# Patient Record
Sex: FEMALE | Race: WHITE | NOT HISPANIC OR LATINO | URBAN - METROPOLITAN AREA
[De-identification: names, ages, dates, MRNs, and addresses within clinical notes are randomized per-mention and may not be internally consistent; named-entity substitution may affect disease eponyms.]

---

## 2023-10-03 ENCOUNTER — INPATIENT (INPATIENT)
Facility: HOSPITAL | Age: 58
LOS: 6 days | Discharge: HOME | End: 2023-10-10
Attending: HOSPITALIST | Admitting: HOSPITALIST
Payer: MEDICARE

## 2023-10-03 VITALS
DIASTOLIC BLOOD PRESSURE: 83 MMHG | TEMPERATURE: 98 F | OXYGEN SATURATION: 99 % | HEART RATE: 90 BPM | SYSTOLIC BLOOD PRESSURE: 121 MMHG | RESPIRATION RATE: 16 BRPM

## 2023-10-03 DIAGNOSIS — R42 DIZZINESS AND GIDDINESS: ICD-10-CM

## 2023-10-03 LAB
ALBUMIN SERPL ELPH-MCNC: 4 G/DL — SIGNIFICANT CHANGE UP (ref 3.3–5)
ALP SERPL-CCNC: 116 U/L — SIGNIFICANT CHANGE UP (ref 40–120)
ALT FLD-CCNC: 51 U/L — HIGH (ref 4–33)
ANION GAP SERPL CALC-SCNC: 13 MMOL/L — SIGNIFICANT CHANGE UP (ref 7–14)
APTT BLD: 35.8 SEC — HIGH (ref 24.5–35.6)
AST SERPL-CCNC: 36 U/L — HIGH (ref 4–32)
BASOPHILS # BLD AUTO: 0.03 K/UL — SIGNIFICANT CHANGE UP (ref 0–0.2)
BASOPHILS NFR BLD AUTO: 0.4 % — SIGNIFICANT CHANGE UP (ref 0–2)
BILIRUB SERPL-MCNC: 0.4 MG/DL — SIGNIFICANT CHANGE UP (ref 0.2–1.2)
BUN SERPL-MCNC: 12 MG/DL — SIGNIFICANT CHANGE UP (ref 7–23)
CALCIUM SERPL-MCNC: 9.3 MG/DL — SIGNIFICANT CHANGE UP (ref 8.4–10.5)
CHLORIDE SERPL-SCNC: 97 MMOL/L — LOW (ref 98–107)
CO2 SERPL-SCNC: 26 MMOL/L — SIGNIFICANT CHANGE UP (ref 22–31)
CREAT SERPL-MCNC: 0.87 MG/DL — SIGNIFICANT CHANGE UP (ref 0.5–1.3)
EGFR: 77 ML/MIN/1.73M2 — SIGNIFICANT CHANGE UP
EOSINOPHIL # BLD AUTO: 0.16 K/UL — SIGNIFICANT CHANGE UP (ref 0–0.5)
EOSINOPHIL NFR BLD AUTO: 1.9 % — SIGNIFICANT CHANGE UP (ref 0–6)
GLUCOSE SERPL-MCNC: 140 MG/DL — HIGH (ref 70–99)
HCT VFR BLD CALC: 38.3 % — SIGNIFICANT CHANGE UP (ref 34.5–45)
HGB BLD-MCNC: 12.3 G/DL — SIGNIFICANT CHANGE UP (ref 11.5–15.5)
IANC: 4.84 K/UL — SIGNIFICANT CHANGE UP (ref 1.8–7.4)
IMM GRANULOCYTES NFR BLD AUTO: 0.6 % — SIGNIFICANT CHANGE UP (ref 0–0.9)
INR BLD: <0.9 RATIO — SIGNIFICANT CHANGE UP (ref 0.85–1.18)
LYMPHOCYTES # BLD AUTO: 2.85 K/UL — SIGNIFICANT CHANGE UP (ref 1–3.3)
LYMPHOCYTES # BLD AUTO: 33.5 % — SIGNIFICANT CHANGE UP (ref 13–44)
MCHC RBC-ENTMCNC: 27 PG — SIGNIFICANT CHANGE UP (ref 27–34)
MCHC RBC-ENTMCNC: 32.1 GM/DL — SIGNIFICANT CHANGE UP (ref 32–36)
MCV RBC AUTO: 84.2 FL — SIGNIFICANT CHANGE UP (ref 80–100)
MONOCYTES # BLD AUTO: 0.57 K/UL — SIGNIFICANT CHANGE UP (ref 0–0.9)
MONOCYTES NFR BLD AUTO: 6.7 % — SIGNIFICANT CHANGE UP (ref 2–14)
NEUTROPHILS # BLD AUTO: 4.84 K/UL — SIGNIFICANT CHANGE UP (ref 1.8–7.4)
NEUTROPHILS NFR BLD AUTO: 56.9 % — SIGNIFICANT CHANGE UP (ref 43–77)
NRBC # BLD: 0 /100 WBCS — SIGNIFICANT CHANGE UP (ref 0–0)
NRBC # FLD: 0 K/UL — SIGNIFICANT CHANGE UP (ref 0–0)
PLATELET # BLD AUTO: 310 K/UL — SIGNIFICANT CHANGE UP (ref 150–400)
POTASSIUM SERPL-MCNC: 4 MMOL/L — SIGNIFICANT CHANGE UP (ref 3.5–5.3)
POTASSIUM SERPL-SCNC: 4 MMOL/L — SIGNIFICANT CHANGE UP (ref 3.5–5.3)
PROT SERPL-MCNC: 6.7 G/DL — SIGNIFICANT CHANGE UP (ref 6–8.3)
PROTHROM AB SERPL-ACNC: 10.1 SEC — SIGNIFICANT CHANGE UP (ref 9.5–13)
RBC # BLD: 4.55 M/UL — SIGNIFICANT CHANGE UP (ref 3.8–5.2)
RBC # FLD: 17.4 % — HIGH (ref 10.3–14.5)
SODIUM SERPL-SCNC: 136 MMOL/L — SIGNIFICANT CHANGE UP (ref 135–145)
TROPONIN T, HIGH SENSITIVITY RESULT: 10 NG/L — SIGNIFICANT CHANGE UP
WBC # BLD: 8.5 K/UL — SIGNIFICANT CHANGE UP (ref 3.8–10.5)
WBC # FLD AUTO: 8.5 K/UL — SIGNIFICANT CHANGE UP (ref 3.8–10.5)

## 2023-10-03 PROCEDURE — 70498 CT ANGIOGRAPHY NECK: CPT | Mod: 26,MA

## 2023-10-03 PROCEDURE — 70450 CT HEAD/BRAIN W/O DYE: CPT | Mod: 26,MA,XU

## 2023-10-03 PROCEDURE — 99291 CRITICAL CARE FIRST HOUR: CPT | Mod: GC

## 2023-10-03 PROCEDURE — 70496 CT ANGIOGRAPHY HEAD: CPT | Mod: 26,MA

## 2023-10-03 PROCEDURE — 99285 EMERGENCY DEPT VISIT HI MDM: CPT

## 2023-10-03 PROCEDURE — 0042T: CPT | Mod: MA

## 2023-10-03 RX ORDER — IPRATROPIUM/ALBUTEROL SULFATE 18-103MCG
3 AEROSOL WITH ADAPTER (GRAM) INHALATION EVERY 6 HOURS
Refills: 0 | Status: ACTIVE | OUTPATIENT
Start: 2023-10-03 | End: 2024-08-31

## 2023-10-03 RX ORDER — ACETAMINOPHEN 500 MG
1000 TABLET ORAL ONCE
Refills: 0 | Status: COMPLETED | OUTPATIENT
Start: 2023-10-03 | End: 2023-10-03

## 2023-10-03 RX ORDER — LAMOTRIGINE 25 MG/1
150 TABLET, ORALLY DISINTEGRATING ORAL DAILY
Refills: 0 | Status: ACTIVE | OUTPATIENT
Start: 2023-10-03 | End: 2024-08-31

## 2023-10-03 RX ORDER — HYDROMORPHONE HYDROCHLORIDE 2 MG/ML
0.5 INJECTION INTRAMUSCULAR; INTRAVENOUS; SUBCUTANEOUS ONCE
Refills: 0 | Status: DISCONTINUED | OUTPATIENT
Start: 2023-10-03 | End: 2023-10-03

## 2023-10-03 RX ORDER — ONDANSETRON 8 MG/1
4 TABLET, FILM COATED ORAL ONCE
Refills: 0 | Status: COMPLETED | OUTPATIENT
Start: 2023-10-03 | End: 2023-10-03

## 2023-10-03 RX ORDER — CHOLECALCIFEROL (VITAMIN D3) 125 MCG
1000 CAPSULE ORAL DAILY
Refills: 0 | Status: ACTIVE | OUTPATIENT
Start: 2023-10-03 | End: 2024-08-31

## 2023-10-03 RX ORDER — BUDESONIDE AND FORMOTEROL FUMARATE DIHYDRATE 160; 4.5 UG/1; UG/1
2 AEROSOL RESPIRATORY (INHALATION)
Refills: 0 | Status: ACTIVE | OUTPATIENT
Start: 2023-10-03 | End: 2024-08-31

## 2023-10-03 RX ORDER — PREGABALIN 225 MG/1
1000 CAPSULE ORAL DAILY
Refills: 0 | Status: ACTIVE | OUTPATIENT
Start: 2023-10-03 | End: 2024-08-31

## 2023-10-03 RX ORDER — AMITRIPTYLINE HCL 25 MG
25 TABLET ORAL DAILY
Refills: 0 | Status: ACTIVE | OUTPATIENT
Start: 2023-10-03 | End: 2024-08-31

## 2023-10-03 RX ORDER — TRIAMTERENE/HYDROCHLOROTHIAZID 75 MG-50MG
1 TABLET ORAL DAILY
Refills: 0 | Status: ACTIVE | OUTPATIENT
Start: 2023-10-03 | End: 2024-08-31

## 2023-10-03 RX ORDER — FLUOXETINE HCL 10 MG
10 CAPSULE ORAL DAILY
Refills: 0 | Status: ACTIVE | OUTPATIENT
Start: 2023-10-03 | End: 2024-08-31

## 2023-10-03 RX ORDER — SODIUM CHLORIDE 9 MG/ML
10 INJECTION INTRAMUSCULAR; INTRAVENOUS; SUBCUTANEOUS ONCE
Refills: 0 | Status: COMPLETED | OUTPATIENT
Start: 2023-10-03 | End: 2023-10-03

## 2023-10-03 RX ORDER — LEVOTHYROXINE SODIUM 125 MCG
25 TABLET ORAL DAILY
Refills: 0 | Status: ACTIVE | OUTPATIENT
Start: 2023-10-03 | End: 2024-08-31

## 2023-10-03 RX ORDER — FUROSEMIDE 40 MG
20 TABLET ORAL DAILY
Refills: 0 | Status: ACTIVE | OUTPATIENT
Start: 2023-10-03 | End: 2024-08-31

## 2023-10-03 RX ORDER — PANTOPRAZOLE SODIUM 20 MG/1
40 TABLET, DELAYED RELEASE ORAL
Refills: 0 | Status: ACTIVE | OUTPATIENT
Start: 2023-10-03 | End: 2024-08-31

## 2023-10-03 RX ORDER — TENECTEPLASE 50 MG
24 KIT INTRAVENOUS ONCE
Refills: 0 | Status: COMPLETED | OUTPATIENT
Start: 2023-10-03 | End: 2023-10-03

## 2023-10-03 RX ORDER — METHYLPHENIDATE HCL 5 MG
20 TABLET ORAL DAILY
Refills: 0 | Status: DISCONTINUED | OUTPATIENT
Start: 2023-10-03 | End: 2023-10-10

## 2023-10-03 RX ORDER — ATORVASTATIN CALCIUM 80 MG/1
40 TABLET, FILM COATED ORAL AT BEDTIME
Refills: 0 | Status: ACTIVE | OUTPATIENT
Start: 2023-10-03 | End: 2024-08-31

## 2023-10-03 RX ORDER — ARIPIPRAZOLE 15 MG/1
20 TABLET ORAL DAILY
Refills: 0 | Status: ACTIVE | OUTPATIENT
Start: 2023-10-03 | End: 2024-08-31

## 2023-10-03 RX ADMIN — ONDANSETRON 4 MILLIGRAM(S): 8 TABLET, FILM COATED ORAL at 23:17

## 2023-10-03 RX ADMIN — Medication 400 MILLIGRAM(S): at 22:06

## 2023-10-03 RX ADMIN — HYDROMORPHONE HYDROCHLORIDE 0.5 MILLIGRAM(S): 2 INJECTION INTRAMUSCULAR; INTRAVENOUS; SUBCUTANEOUS at 23:35

## 2023-10-03 RX ADMIN — SODIUM CHLORIDE 10 MILLILITER(S): 9 INJECTION INTRAMUSCULAR; INTRAVENOUS; SUBCUTANEOUS at 21:44

## 2023-10-03 RX ADMIN — TENECTEPLASE 3456 MILLIGRAM(S): KIT at 21:44

## 2023-10-03 RX ADMIN — HYDROMORPHONE HYDROCHLORIDE 0.5 MILLIGRAM(S): 2 INJECTION INTRAMUSCULAR; INTRAVENOUS; SUBCUTANEOUS at 23:50

## 2023-10-03 NOTE — ED PROVIDER NOTE - PHYSICAL EXAMINATION
Gen: NAD, AOx3, able to make needs known, non-toxic  Head: NCAT  HEENT: EOMI, normal conjunctiva, nystagmus with right sided gaze   Lung: CTAB, no respiratory distress, no wheezes/rhonchi/rales B/L, speaking in full sentences  CV: RRR, no M/R/G, pulses bilaterally   Abd: soft, NTND, no guarding  MSK: no visible bony deformities  Neuro: No focal sensory or motor deficits, 5/5 strength in BUE and BLE, CN 3-12 intact, finger to nose intact bilaterally, no pronator shift, no slurred speech   Skin: Warm, well perfused, no rash  Psych: normal affect Gen: NAD, AOx3, able to make needs known, non-toxic  Head: NCAT  HEENT: EOMI, normal conjunctiva, nystagmus with right sided gaze   Lung: CTAB, no respiratory distress, no wheezes/rhonchi/rales B/L, speaking in full sentences  CV: RRR, no M/R/G, pulses bilaterally   Abd: soft, NTND, no guarding  MSK: no visible bony deformities  Neuro: RUE numbness, 5/5 strength in BUE and BLE, CN 3-12 intact, finger to nose intact bilaterally, no pronator shift, no slurred speech   Skin: Warm, well perfused, no rash  Psych: normal affect

## 2023-10-03 NOTE — ED PROVIDER NOTE - ATTENDING CONTRIBUTION TO CARE
DR. BLOCH, ATTENDING MD-  I performed a face to face bedside interview with patient regarding history of present illness, review of symptoms and past medical history. I completed an independent physical exam.  I have discussed patient's plan of care with the resident.  Patient well appearing NAD HEENT nml 2-12 intact, PERRL, neck no bruits, no JVD, heart s1s2 no mgr, lungs clear, abd soft nontender, extrem no edema. pulses intact right sidedsubj decreased sensation, mild weakness. pos nystagmus.

## 2023-10-03 NOTE — H&P ADULT - ASSESSMENT
Patient is 58yFemale with PMHx of *** who presents with ***.     ======NEURO======    ======CV======    ======PULM======    ======RENAL======    ======GI======    ======ENDO======    ======METABOLIC======    ======HEMATOLOGIC======    ======ID======    ======Ethics/Prophylaxis======       58 year old right handed woman with a PMHx significant for COPD, Meniere's disease s/p R labyrinthectomy, bipolar disorder p/w vertigo, blurry vision, loss of balance when ambulating c/f CVA, admitted to MICU for close neuro monitoring    ======NEURO======  #CVA  - NIHSS 1 on admission (10/3); s/p tenecteplase in the ED (21:44 on 10/3)  - q1h neuro check;   - BP goal < 180/105;   - Telemetry  - CTH (10/3) no acute intracranial findings  - f/u Repeat CTH in 24 hours   - f/u MRI brain w/o con  - f/u A1c, Lipid profile, Utox  - f/u TTE w/ bubble study  - c/w Atorvastatin 80mg qd  - f/u Neurology recs      #Meniere's disease  - s/p R labyrinthectomy    ======CV======  #No active issues  - BP goal < 180/105    ======PULM======  #COPD  - Not in acute exacerbation  - c/w home medications    ======RENAL======  #No active issues  - Trend sCr    ======GI======  #No active issues  - Keep NPO, pending swallow eval    ======ENDO======  #No active issues  - f/u A1c, Lipid profile  - c/w Atorvastatin 80mg QD  - ISS    ======METABOLIC======  #No active issues    ======HEMATOLOGIC======  #DVT ppx  - s/p tenecteplase in the ED (21:44 on 10/3)  - SCDs  ======ID======  #No active issues    ======Ethics/Prophylaxis======  Code: Full code  DVT: SCDs       58 year old right handed woman with a PMHx significant for COPD, Meniere's disease s/p R labyrinthectomy, bipolar disorder p/w vertigo, blurry vision, loss of balance when ambulating c/f CVA, admitted to MICU for close neuro monitoring    ======NEURO======  #CVA  - NIHSS 1 on admission (10/3); s/p tenecteplase in the ED (21:44 on 10/3)  - q1h neuro check;   - BP goal < 180/105;   - Telemetry  - CTH and CTA brain/neck (10/3) unrevealing  - f/u Repeat CTH in 24 hours   - f/u MRI brain w/o con  - f/u A1c, Lipid profile, Utox  - f/u TTE w/ bubble study  - c/w Atorvastatin 80mg qd  - f/u Neurology recs      #Meniere's disease  - s/p R labyrinthectomy    ======CV======  #No active issues  - BP goal < 180/105    ======PULM======  #COPD  - Not in acute exacerbation  - c/w home medications    ======RENAL======  #No active issues  - Trend sCr    ======GI======  #No active issues  - Keep NPO, pending swallow eval    ======ENDO======  #No active issues  - f/u A1c, Lipid profile  - c/w Atorvastatin 80mg QD  - ISS    ======METABOLIC======  #No active issues    ======HEMATOLOGIC======  #DVT ppx  - s/p tenecteplase in the ED (21:44 on 10/3)  - SCDs  ======ID======  #No active issues    ======Ethics/Prophylaxis======  Code: Full code  DVT: SCDs       58 year old right handed woman with a PMHx significant for COPD, Meniere's disease s/p R labyrinthectomy, bipolar disorder p/w vertigo, blurry vision, loss of balance when ambulating c/f CVA, admitted to MICU for close neuro monitoring    ======NEURO======  #CVA  - NIHSS 1 on admission (10/3); s/p tenecteplase in the ED (21:44 on 10/3)  - q1h neuro check;   - BP goal < 180/105;   - Telemetry  - CTH and CTA brain/neck (10/3) unrevealing  - f/u Repeat CTH in 24 hours   - f/u MRI brain w/o con  - f/u A1c, Lipid profile, Utox  - f/u TTE w/ bubble study  - c/w Atorvastatin 80mg qd  - f/u Neurology recs    #Meniere's disease  - s/p R labyrinthectomy    ======CV======  #No active issues  - BP goal < 180/105    ======PULM======  #COPD  - Not in acute exacerbation  - c/w home medications    ======RENAL======  #No active issues  - Trend sCr    ======GI======  #No active issues  - Keep NPO, pending swallow eval    ======ENDO======  #No active issues  - f/u A1c, Lipid profile  - c/w Atorvastatin 80mg QD  - ISS    ======METABOLIC======  #No active issues    ======HEMATOLOGIC======  #DVT ppx  - s/p tenecteplase in the ED (21:44 on 10/3)  - SCDs  ======ID======  #No active issues    ======Ethics/Prophylaxis======  Code: Full code  DVT: SCDs

## 2023-10-03 NOTE — H&P ADULT - ATTENDING COMMENTS
pt is a 59 yo female with hx copd, menieres disease, bipolar disorder  who presents with right hand and leg weakness started two hours prior  to  er visit, In the er ct scan nondiagnostic, pt sen by neurology and   tnk given for acute thrombotic stroke,  Asked to evaluate  pt s/p tenecteplase.   PE bp 120/74 rr 18 heent pupils 3 mm b/l reactive   no facial asymmetry , motor 5/5 lue, 3/5 rue,  5/5 lle, 2/5 lle  lungs clear heart s1s2 abdomen obese ext no edema    labs as above   wbc 9 hgb 12 hct 36 bicarb 26 cr 0.87   ct scans reviewed no ich,      A/P  59 yo female with acute thrombotic stroke with tenecteplase given  for acute cva,  -monitor pt closely, neuro check q 1 hrs,  -check echo to evaluate lv function  -check troponin, ekg   -monitor urine output,   -f/u vbg post bipap  -critically ill with acute cva s/p tnk

## 2023-10-03 NOTE — ED ADULT TRIAGE NOTE - CHIEF COMPLAINT QUOTE
alert oriented c/o SOB feels foggy/dizzy  has a headache started today  states she gained 10lbs in one day states vision became blurry suddenly at 1800  ( states all s/s started suddenly about 1800) hx COPD Meniere's bipolar lap band mult abd surgeries

## 2023-10-03 NOTE — ED PROVIDER NOTE - NS ED ROS FT
GENERAL: No fever, no chills  EYES: No change in vision  HEENT: No trouble swallowing or speaking  CARDIAC: No chest pain  PULMONARY: No cough, no SOB  GI: No abdominal pain, no nausea, no vomiting, no diarrhea, no constipation  : No changes in urination  SKIN: No rashes  NEURO: +headache, +R sided numbness  MSK: No joint pain  Otherwise as HPI or negative.

## 2023-10-03 NOTE — ED PROVIDER NOTE - CLINICAL SUMMARY MEDICAL DECISION MAKING FREE TEXT BOX
[facility], PGY3 - 57yo woman with PMH copd, menieres, presenting with dizziness, headache that started at 6pm. Patient has RUE numbness, right sided deviation with walking and unsteadiness. labs, Cts, neuro at bedside. tba vs cdu for MRI. *The above represents an initial assessment/impression. Please refer to progress notes for potential changes in patient clinical course*

## 2023-10-03 NOTE — ED PROVIDER NOTE - NS_BEDUNITTYPES_ED_ALL_ED
Health Maintenance Due   Topic Date Due   • Diabetes Foot Exam  02/05/1965   • Shingles Vaccine (1 of 2) 02/05/1997   • Diabetes Eye Exam  07/31/2019   • Influenza Vaccine (1) 09/01/2019       Patient is due for topics as listed above but is not proceeding with Immunization(s) Shingles at this time. Patient to discuss influenza, perform foot exam today.           MICU

## 2023-10-03 NOTE — CONSULT NOTE ADULT - ATTENDING COMMENTS
I saw and examined the patient on stroke rounds.   She presented yesterday with sudden onset vertigo, and had R sided motor and sensory complaints.    She received TNK in the ED.   This AM, she reports a worsening headache, and persistent vertigo on head turning, and persistent R arm and leg weakness.     On exam;  GEN: lying in bed, NAD  CV: RRR, S1, S2  PULM: CTAB  HEENT: no nuchal rigidity.    GI: soft, non tender  EXTREM: no CCE  NEURO:   Awake, alert, oriented to month, year, hospital, normal naming, fluency, gives a clear history.   Pupils 2-1mm, symmetric, full VF's, normal EOM, conjugate gaze, no facial weakness, facial sensation mildly decreased on the R V2, no dysarthria, tongue midline, palate symmetric.   MOTOR: 4/5 R deltoids, biceps, triceps, and .  5/5 on the L.  R hip flexion and knee extension 4+/5.  5/5 ankle plantar and dorsiflexion.   SENSORY: diminished pinprick sensation R arm and leg, compared to L.   COORDINATION: normal FNF  REFLEXES: symmetric 1+ BB, BR, triceps, patellar, trace achilles.     CTH images reviewed: no hemorrhage.  No large territorial infarct.   CTa H&N images reviewed: atherosclerotic plaque, bilateral carotid, possibly ulcerated on the L.      AP: 58 year old woman with COPD, Meniere's disease, s/p R labyrinthectomy, presenting with acute onset vertigo, and R sided motor sensory symptoms, s/p tenecteplase.  On exam today, has NIHSS of 3.  CTH no acute findings, repeat stable.  Has possible ulcerated plaque on the R ICA.   -MRI brain  -echo  -cardiac monitoring  -after 24h post tenecteplase, and if 24h repeat CTH without any hemorrhage, would start on DAPt for 3 weeks, no initial load, ASA 81, and plavix 75mg   -continue on high intensity statin  -post-TNK monitoring per protocol  -neurology to follow.  Please page or call with any acute neuro changes, or other issues we can help address.

## 2023-10-03 NOTE — ED ADULT NURSE NOTE - NSFALLRISKINTERV_ED_ALL_ED
Assistance OOB with selected safe patient handling equipment if applicable/Assistance with ambulation/Communicate fall risk and risk factors to all staff, patient, and family/Encourage patient to sit up slowly, dangle for a short time, stand at bedside before walking/Monitor gait and stability/Orthostatic vital signs/Provide patient with walking aids/Provide visual cue: yellow wristband, yellow gown, etc/Reinforce activity limits and safety measures with patient and family/Call bell, personal items and telephone in reach/Instruct patient to call for assistance before getting out of bed/chair/stretcher/Non-slip footwear applied when patient is off stretcher/[place] to call system/Physically safe environment - no spills, clutter or unnecessary equipment/Purposeful Proactive Rounding/Room/bathroom lighting operational, light cord in reach

## 2023-10-03 NOTE — H&P ADULT - NSHPREVIEWOFSYSTEMS_GEN_ALL_CORE
REVIEW OF SYSTEMS:    CONSTITUTIONAL: No weakness, fevers or chills  HEENT: Yes dizziness, headache, blurry vision  RESPIRATORY: No cough, wheezing, hemoptysis; No shortness of breath  CARDIOVASCULAR: No chest pain, palpitations  GASTROINTESTINAL: No abdominal pain, nausea, vomiting, hematemesis, diarrhea, constipation, melena, hematochezia  GENITOURINARY: No dysuria, frequency, urgency, hematuria  NEUROLOGICAL: No numbness, weakness  SKIN: No itching, rashes

## 2023-10-03 NOTE — H&P ADULT - NSHPLABSRESULTS_GEN_ALL_CORE
12.3   8.50  )-----------( 310      ( 03 Oct 2023 20:50 )             38.3       10-03    136  |  97<L>  |  12  ----------------------------<  140<H>  4.0   |  26  |  0.87    Ca    9.3      03 Oct 2023 20:50    TPro  6.7  /  Alb  4.0  /  TBili  0.4  /  DBili  x   /  AST  36<H>  /  ALT  51<H>  /  AlkPhos  116  10-03              Urinalysis Basic - ( 03 Oct 2023 20:50 )    Color: x / Appearance: x / SG: x / pH: x  Gluc: 140 mg/dL / Ketone: x  / Bili: x / Urobili: x   Blood: x / Protein: x / Nitrite: x   Leuk Esterase: x / RBC: x / WBC x   Sq Epi: x / Non Sq Epi: x / Bacteria: x        PT/INR - ( 03 Oct 2023 20:50 )   PT: 10.1 sec;   INR: <0.90 ratio         PTT - ( 03 Oct 2023 20:50 )  PTT:35.8 sec    Lactate Trend            CAPILLARY BLOOD GLUCOSE  157 (03 Oct 2023 20:54)      POCT Blood Glucose.: 157 mg/dL (03 Oct 2023 20:38)

## 2023-10-03 NOTE — H&P ADULT - HISTORY OF PRESENT ILLNESS
58 year old right handed woman with a PMHx significant for COPD, Meniere's disease s/p R labyrinthectomy, bipolar disorder who presents as code stroke for acute onset dizziness, blurry vision, and difficulty ambulating. Patient reports intermittent episodes of room spinning dizziness, feels unsteady when ambulating.  Not as severe as when she has her vertigo from Menieres. Denies fall or headstrike. Reports last time she has an episode of vertigo was in April before her labyrinthectomy. Last known well 6PM on 10/3, started having room spinning dizziness and blurry vision described as feeling like she is in a fog. In the ED, vitals 97.9F, HR 90, /83, satting 99% on RA. Noted to have RUE numbness and gait ataxia on examination, CTH showed no acute intracranial findings, patient was given tenecteplase in the ED and admitted to MICU for close neuro monitoring.   58 year old right handed woman with a PMHx significant for COPD, Meniere's disease s/p R labyrinthectomy, bipolar disorder who presents as code stroke for acute onset dizziness, blurry vision, and difficulty ambulating. Patient reports intermittent episodes of room spinning dizziness, feels unsteady when ambulating.  Not as severe as when she has her vertigo from Menieres. Denies fall or headstrike. Reports last time she has an episode of vertigo was in April before her labyrinthectomy. Last known well 6PM on 10/3, started having room spinning dizziness, difficulty ambulating 2/2 loss of balance and blurry vision which prompted her ED visit. In the ED, vitals 97.9F, HR 90, /83, satting 99% on RA. Noted to have RUE numbness and gait ataxia on examination, CTH showed no acute intracranial findings, patient was given tenecteplase in the ED and admitted to MICU for close neuro monitoring.   58 year old right handed woman with a PMHx significant for COPD, Meniere's disease s/p R labyrinthectomy, bipolar disorder who presents as code stroke for acute onset dizziness, blurry vision, and difficulty ambulating. Patient reports intermittent episodes of room spinning dizziness, feels unsteady when ambulating.  Not as severe as when she has her vertigo from Menieres. Denies fall or headstrike. Reports last time she has an episode of vertigo was in April before her labyrinthectomy. Last known well 6PM on 10/3, started having room spinning dizziness, difficulty ambulating 2/2 loss of balance and blurry vision which prompted her ED visit. In the ED, vitals 97.9F, HR 90, /83, satting 99% on RA. Noted to have RUE numbness and gait ataxia on examination, CTH and CTA brain/neck unrevealing, patient was given tenecteplase in the ED and admitted to MICU for close neuro monitoring.

## 2023-10-03 NOTE — ED PROVIDER NOTE - NSTIMEPROVIDERCAREINITIATE_GEN_ER
Echo results are showing  Left ventricle: Cavity is normal size, systolic function is normal, wall motion is normal ejection fraction is 65%  Mitral valve: Trivial regurgitation  Right ventricle: The cavity is normal size, systolic function is normal  Aortic valve: The valve is probably trileaflet and normal thickness.  Mobility is not restricted.  No significant regurgitation  Mitral valve: Mild calcification.  The leaflets are normal thickness and separation.  Mobility is not restricted.  Trivial regurgitation    Follow-up with cardiology as planned 03-Oct-2023 20:47

## 2023-10-03 NOTE — ED ADULT NURSE NOTE - PAIN RATING/NUMBER SCALE (0-10): ACTIVITY
@Maria Fernanda Dejesus's goals for this visit include:   Chief Complaint   Patient presents with     Hair/Scalp Problem     lesions all over       She requests these members of her care team be copied on today's visit information: NO    PCP: Jorge Rockwell    Referring Provider:  Jorge Rockwell MD PhD  [id number] [place] [address]    [facility] 09/22/2014 (Approximate)     Do you need any medication refills at today's visit? NO    [facility]     5 (moderate pain)

## 2023-10-03 NOTE — ED PROVIDER NOTE - OBJECTIVE STATEMENT
57yo woman with PMH COPD, Rhett, presenting due to sudden dizziness, headache, blurry vision at 6pm. States that she had a labyrinthectomy for the menieres, this dizziness is different from her priors in that it is mostly a loss of balance instead of room spinning. Denies alcohol or drug use. Cutting down on cigarettes.

## 2023-10-03 NOTE — ED ADULT NURSE REASSESSMENT NOTE - NS ED NURSE REASSESS COMMENT FT1
Report received from aleah RNSheryl. A&Ox4. Well-appearing sitting up in stretcher. HOB elevated. Respirations even and unlabored. No acute distress noted. Mobile critical care RNCynthia at bedside with pt at this time. Safety maintained.

## 2023-10-03 NOTE — ED ADULT NURSE NOTE - BREATHING, MLM
Spontaneous, unlabored and symmetrical
Awake/Patient baseline mental status/Alert and oriented to person, place and time

## 2023-10-03 NOTE — CONSULT NOTE ADULT - SUBJECTIVE AND OBJECTIVE BOX
Neurology - Consult Note    -  Spectra: [phone] ([facility]), [phone] ([facility])  -    HPI: Patient KENTRELL FLEMING is a 58y (1965) right handed woman with a PMHx significant for COPD, Meniere's disease s/p R labyrinthectomy, bipolar disorder who presents as code stroke for acute onset dizziness, difficulty ambulating.   Reports intermittent episodes of room spinning dizziness, feels unsteady when ambulating.  Not as severe as when she has her vertigo from [place]. Denies fall or headstrike. Reports last time she has an episode of vertigo was in April before her labyrinthectomy.   Patient also noted to have RUE numbness on exam.  Denies headache. Initially reported blurry vision but reports now although reports she feels like she is in a fog. Denies word finding difficulty, no facial droop or slurred speech noted. Denies focal weakness, visual deficit or diplopia.  Nystagmus on R gaze. Feels dizzy when looking up. Not on AC/AP. Questionable veering to R when ambulating.     NIHSS: 1  LKW: 18:00  pre MRS: 3      Review of Systems:    CONSTITUTIONAL: No fevers or chills  EYES AND ENT: No visual changes or no throat pain   NECK: No pain or stiffness  RESPIRATORY: No hemoptysis or shortness of breath  CARDIOVASCULAR: No chest pain or palpitations  GASTROINTESTINAL: No melena or hematochezia  GENITOURINARY: No dysuria or hematuria  NEUROLOGICAL: +As stated in HPI above  SKIN: No itching, burning, rashes, or lesions   All other review of systems is negative unless indicated above.    Allergies:  No Known Allergies      PMHx/PSHx/Family Hx: As above, otherwise see below   COPD exacerbation        Social Hx:  Was smoking a pack per day, quit approx a month ago.   Denies alcohol, or illicit drugs  Uses rollator to ambulate    Medications:  MEDICATIONS  (STANDING):    MEDICATIONS  (PRN):      Vitals:  T(C): 36.6 (10-03-23 @ 20:30), Max: 36.6 (10-03-23 @ 20:30)  HR: 90 (10-03-23 @ 20:30) (90 - 90)  BP: 121/83 (10-03-23 @ 20:30) (121/83 - 121/83)  RR: 16 (10-03-23 @ 20:30) (16 - 16)  SpO2: 99% (10-03-23 @ 20:30) (99% - 99%)    Physical Examination: INCOMPLETE  General - NAD  Cardiovascular - Peripheral pulses palpable, no edema  Eyes - Fundoscopy with flat, sharp optic discs and no hemorrhage or exudates; Fundoscopy not well visualized; Fundoscopy not performed due to safety precautions in the setting of the COVID-19 pandemic    Neurologic Exam:  Mental status - Awake, Alert, Oriented to person, place, and time. Speech fluent, repetition and naming intact. Follows simple and complex commands. Attention/concentration, recent and remote memory (including registration and recall), and fund of knowledge intact    Cranial nerves - PERRLA, VFF, EOMI, face sensation (V1-V3) intact b/l, facial strength intact without asymmetry b/l, hearing intact b/l, palate with symmetric elevation, trapezius OR sternocleidomastiod 5/5 strength b/l, tongue midline on protrusion with full lateral movement    Motor - Normal bulk and tone throughout. No pronator drift.  Strength testing            Deltoid      Biceps      Triceps     Wrist Extension    Wrist Flexion     Interossei         R            5                 5               5                     5                              5                        5                 5  L             5                 5               5                     5                              5                        5                 5              Hip Flexion    Hip Extension    Knee Flexion    Knee Extension    Dorsiflexion    Plantar Flexion  R              5                           5                       5                           5                            5                          5  L              5                           5                        5                           5                            5                          5    Sensation - Light touch/temperature OR pain/vibration intact throughout    DTR's -             Biceps      Triceps     Brachioradialis      Patellar    Ankle    Toes/plantar response  R             2+             2+                  2+                       2+            2+                 Down  L              2+             2+                 2+                        2+           2+                 Down    Coordination - Finger to Nose intact b/l. No tremors appreciated    Gait and station - Normal casual gait. Romberg (-)    Labs:      CAPILLARY BLOOD GLUCOSE  157 (03 Oct 2023 20:54)      POCT Blood Glucose.: 157 mg/dL (03 Oct 2023 20:38)        Radiology:       Neurology - Consult Note    -  Spectra: [phone] ([facility]), [phone] ([facility])  -    HPI: Patient KENTRELL FLEMING is a 58y (1965) right handed woman with a PMHx significant for COPD, Meniere's disease s/p R labyrinthectomy, bipolar disorder who presents as code stroke for acute onset dizziness, difficulty ambulating.   Reports intermittent episodes of room spinning dizziness, feels unsteady when ambulating.  Not as severe as when she has her vertigo from Meniere's Denies fall or headstrike. Reports last time she has an episode of vertigo was in April before her labyrinthectomy.   Patient also noted to have RUE numbness on exam.  Denies headache. Initially reported blurry vision but reports now although reports she feels like she is in a fog. Denies word finding difficulty, no facial droop or slurred speech noted. Denies focal weakness, visual deficit or diplopia.  Nystagmus on R gaze. Feels dizzy when looking up and rightwards Not on AC/AP. Appears to be veering to R when ambulating.     NIHSS: 1  LKW: 18:00  pre MRS: 3      Review of Systems:    CONSTITUTIONAL: No fevers or chills  EYES AND ENT: No visual changes or no throat pain   NECK: No pain or stiffness  RESPIRATORY: No hemoptysis or shortness of breath  CARDIOVASCULAR: No chest pain or palpitations  GASTROINTESTINAL: No melena or hematochezia  GENITOURINARY: No dysuria or hematuria  NEUROLOGICAL: +As stated in HPI above  SKIN: No itching, burning, rashes, or lesions   All other review of systems is negative unless indicated above.    Allergies:  No Known Allergies      PMHx/PSHx/Family Hx: As above, otherwise see below   COPD exacerbation      Social Hx:  Was smoking a pack per day, quit approx a month ago.   Denies alcohol, or illicit drugs  Uses rollator to ambulate    Medications:  MEDICATIONS  (STANDING):    MEDICATIONS  (PRN):      Vitals:  T(C): 36.6 (10-03-23 @ 20:30), Max: 36.6 (10-03-23 @ 20:30)  HR: 90 (10-03-23 @ 20:30) (90 - 90)  BP: 121/83 (10-03-23 @ 20:30) (121/83 - 121/83)  RR: 16 (10-03-23 @ 20:30) (16 - 16)  SpO2: 99% (10-03-23 @ 20:30) (99% - 99%)    Physical Examination:   General - NAD  Cardiovascular - Peripheral pulses palpable, no edema  Eyes -  Fundoscopy not performed due to safety precautions in the setting of the COVID-19 pandemic  Head impulse with corrective saccades B/L  Right beating nystagmus noted on right ramirez gaze  No skew deviation noted     Neurologic Exam:  Mental status - Awake, Alert, Oriented to person, place, and time. Speech fluent, repetition and naming intact. Follows simple and complex commands. Attention/concentration, recent and remote memory (including registration and recall), and fund of knowledge intact    Cranial nerves - PERRLA, VFF, EOMI, face sensation (V1-V3) intact b/l, facial strength intact without asymmetry b/l, hearing intact b/l, palate with symmetric elevation, trapezius 5/5 strength b/l, tongue midline on protrusion with full lateral movement    Motor - Normal bulk and tone throughout. No pronator drift.  Strength testing            Deltoid      Biceps      Triceps     Wrist Extension    Wrist Flexion     Interossei         R            5                 5               5                     5                              5                        5                 5  L             5                 5               5                     5                              5                        5                 5              Hip Flexion    Hip Extension    Knee Flexion    Knee Extension    Dorsiflexion    Plantar Flexion  R              5                           5                       5                           5                            5                          5  L              5                           5                        5                           5                            5                          5    Sensation - Light touch decreased on RUE, otherwise intact throughout    DTR's -             Biceps      Triceps     Brachioradialis      Patellar    Ankle    Toes/plantar response  R             2+             2+                  2+                       2+            2+                 Down  L              2+             2+                 2+                        2+           2+                 Down    Coordination - Finger to Nose intact b/l. No tremors appreciated. Unable to assess heel to shin due to body habitus    Gait and station - Wide based, unsteady gait. Only able to take few steps before holding onto support     Labs:      CAPILLARY BLOOD GLUCOSE  157 (03 Oct 2023 20:54)      POCT Blood Glucose.: 157 mg/dL (03 Oct 2023 20:38)    Radiology:    < from: CT Brain Stroke Protocol (10.03.23 @ 21:14) >  FINDINGS:    BRAIN:No apparent acute infarct, hemorrhage or mass. No hydrocephalus.   Chronic appearing white matter hypodensities and volume loss.    CALVARIUM: No skull fracture or concerning osseous lesions.    EXTRACRANIAL SOFT TISSUES: No acute findings.    VISUALIZED PORTIONS OF THE PARANASAL SINUSES AND MASTOID AIR CELLS: No   air fluid levels. Status post right canal wall up mastoidectomy.    IMPRESSION:  No acute intracranial findings.    < from: CT Brain Perfusion Maps Stroke (10.03.23 @ 21:15) >  FINDINGS:    CT PERFUSION BRAIN:  Perfusion maps symmetric with no evidence of territorial infarct or   ischemia.    CBF < 30%: 0  TMax > 6s: 0  Mismatch volume: 0  Mismatch ratio: 0    CTA HEAD/NECK:    AORTIC ARCH: Left-sided aortic arch. Mild atherosclerosis of the arch and   great vessel origins with no flow-limiting stenosis.    RIGHT ANTERIOR CIRCULATION:  CCA: Normal course and caliber. No dissection.  ICA: Mild atherosclerosis. No flow-limiting stenosis. No dissection. No   anuerysm.  MCA: Normal course and caliber. No anuerysm.  NAT: Normal course and caliber. No anuerysm.    LEFT ANTERIOR CIRCULATION:  CCA: Mild atherosclerosis. No flow-limiting stenosis. No dissection.  ICA: Small ulcerated plaque posterior aspect of the origin of the left   ICA. Otherwise mild atherosclerosis. No flow-limiting stenosis. No   dissection. No anuerysm.  MCA: Normal course and caliber. No anuerysm.  NAT: Normal course and caliber. No anuerysm.    POSTERIOR CIRCULATION:  VERTEBRALS: Normal course and caliber. No dissection. Codominant.   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  BASILAR: Normal course and caliber. No dissection. No anuerysm.  PCA: Normal course and caliber. No dissection. No anuerysm.    VEINS: No intracranial DVT.    OTHERS:  Status post right canal wall up mastoidectomy.  Mild subpleural emphysema partially visible in the mid and upper lungs.  Degenerative changes lower cervical spine, no evidence ofhigh-grade   spinal canal narrowing.  Surgical anchors left bony glenoid.    IMPRESSION:    CT PERFUSION BRAIN:  No evidence of territorial infarct or ischemia.    CTA HEAD:  No flow-limiting stenosis, occlusion or aneurysm.    CTA NECK:  No flow-limiting stenosis, occlusion or dissection.  Small ulcerated plaque posterior aspect of the origin of the left ICA   with no significant luminal narrowing.                   Neurology - Consult Note    -  Spectra: [phone] ([facility]), [phone] ([facility])  -    HPI: Patient KENTRELL FLEMING is a 58y (1965) right handed woman with a PMHx significant for COPD, Meniere's disease s/p R labyrinthectomy, bipolar disorder who presents as code stroke for acute onset dizziness, difficulty ambulating. Reports intermittent episodes of room spinning dizziness, feels unsteady when ambulating.  Not as severe as when she has her vertigo from Meniere's Denies fall or headstrike. Reports last time she has an episode of vertigo was in April before her labyrinthectomy. Patient also noted to have RUE numbness on exam.  Denies headache. Initially reported blurry vision but reports now although reports she feels like she is in a fog. Denies word finding difficulty, no facial droop or slurred speech noted. Denies focal weakness, visual deficit or diplopia.  Nystagmus on R gaze. Feels dizzy when looking up and rightwards Not on AC/AP. Appears to be veering to R when ambulating.     NIHSS: 1  LKW: 18:00  pre MRS: 3      Review of Systems:    CONSTITUTIONAL: No fevers or chills  EYES AND ENT: No visual changes or no throat pain   NECK: No pain or stiffness  RESPIRATORY: No hemoptysis or shortness of breath  CARDIOVASCULAR: No chest pain or palpitations  GASTROINTESTINAL: No melena or hematochezia  GENITOURINARY: No dysuria or hematuria  NEUROLOGICAL: +As stated in HPI above  SKIN: No itching, burning, rashes, or lesions   All other review of systems is negative unless indicated above.    Allergies:  No Known Allergies      PMHx/PSHx/Family Hx: As above, otherwise see below   COPD exacerbation      Social Hx:  Was smoking a pack per day, quit approx a month ago.   Denies alcohol, or illicit drugs  Uses rollator to ambulate    Medications:  MEDICATIONS  (STANDING):    MEDICATIONS  (PRN):      Vitals:  T(C): 36.6 (10-03-23 @ 20:30), Max: 36.6 (10-03-23 @ 20:30)  HR: 90 (10-03-23 @ 20:30) (90 - 90)  BP: 121/83 (10-03-23 @ 20:30) (121/83 - 121/83)  RR: 16 (10-03-23 @ 20:30) (16 - 16)  SpO2: 99% (10-03-23 @ 20:30) (99% - 99%)    Physical Examination:   General - NAD  Cardiovascular - Peripheral pulses palpable, no edema  Eyes -  Fundoscopy not performed due to safety precautions in the setting of the COVID-19 pandemic  Head impulse with corrective saccades B/L  Right beating nystagmus noted on right ramirez gaze  No skew deviation noted     Neurologic Exam:  Mental status - Awake, Alert, Oriented to person, place, and time. Speech fluent, repetition and naming intact. Follows simple and complex commands. Attention/concentration, recent and remote memory (including registration and recall), and fund of knowledge intact    Cranial nerves - PERRLA, VFF, EOMI, face sensation (V1-V3) intact b/l, facial strength intact without asymmetry b/l, hearing intact b/l, palate with symmetric elevation, trapezius 5/5 strength b/l, tongue midline on protrusion with full lateral movement    Motor - Normal bulk and tone throughout. No pronator drift.  Strength testing- limited by patient effort due to dizziness   RUE 4+/5 grossly throughout  RLE 4+/5 grossly  throughout  LUE 4+/5 grossly  throughout  LLE 4/5 proximal, distal 4+/5    Sensation - Light touch decreased on RUE, otherwise intact throughout    DTR's -             Biceps      Triceps     Brachioradialis      Patellar    Ankle    Toes/plantar response  R             2+             2+                  2+                       2+            2+                 Down  L              2+             2+                 2+                        2+           2+                 Down    Coordination - Finger to Nose intact b/l. No tremors appreciated. Unable to assess heel to shin due to body habitus    Gait and station - Wide based, unsteady gait. Only able to take few steps before holding onto support     Labs:      CAPILLARY BLOOD GLUCOSE  157 (03 Oct 2023 20:54)      POCT Blood Glucose.: 157 mg/dL (03 Oct 2023 20:38)    Radiology:    < from: CT Brain Stroke Protocol (10.03.23 @ 21:14) >  FINDINGS:    BRAIN:No apparent acute infarct, hemorrhage or mass. No hydrocephalus.   Chronic appearing white matter hypodensities and volume loss.    CALVARIUM: No skull fracture or concerning osseous lesions.    EXTRACRANIAL SOFT TISSUES: No acute findings.    VISUALIZED PORTIONS OF THE PARANASAL SINUSES AND MASTOID AIR CELLS: No   air fluid levels. Status post right canal wall up mastoidectomy.    IMPRESSION:  No acute intracranial findings.    < from: CT Brain Perfusion Maps Stroke (10.03.23 @ 21:15) >  FINDINGS:    CT PERFUSION BRAIN:  Perfusion maps symmetric with no evidence of territorial infarct or   ischemia.    CBF < 30%: 0  TMax > 6s: 0  Mismatch volume: 0  Mismatch ratio: 0    CTA HEAD/NECK:    AORTIC ARCH: Left-sided aortic arch. Mild atherosclerosis of the arch and   great vessel origins with no flow-limiting stenosis.    RIGHT ANTERIOR CIRCULATION:  CCA: Normal course and caliber. No dissection.  ICA: Mild atherosclerosis. No flow-limiting stenosis. No dissection. No   anuerysm.  MCA: Normal course and caliber. No anuerysm.  NAT: Normal course and caliber. No anuerysm.    LEFT ANTERIOR CIRCULATION:  CCA: Mild atherosclerosis. No flow-limiting stenosis. No dissection.  ICA: Small ulcerated plaque posterior aspect of the origin of the left   ICA. Otherwise mild atherosclerosis. No flow-limiting stenosis. No   dissection. No anuerysm.  MCA: Normal course and caliber. No anuerysm.  NAT: Normal course and caliber. No anuerysm.    POSTERIOR CIRCULATION:  VERTEBRALS: Normal course and caliber. No dissection. Codominant.   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  BASILAR: Normal course and caliber. No dissection. No anuerysm.  PCA: Normal course and caliber. No dissection. No anuerysm.    VEINS: No intracranial DVT.    OTHERS:  Status post right canal wall up mastoidectomy.  Mild subpleural emphysema partially visible in the mid and upper lungs.  Degenerative changes lower cervical spine, no evidence ofhigh-grade   spinal canal narrowing.  Surgical anchors left bony glenoid.    IMPRESSION:    CT PERFUSION BRAIN:  No evidence of territorial infarct or ischemia.    CTA HEAD:  No flow-limiting stenosis, occlusion or aneurysm.    CTA NECK:  No flow-limiting stenosis, occlusion or dissection.  Small ulcerated plaque posterior aspect of the origin of the left ICA   with no significant luminal narrowing.

## 2023-10-03 NOTE — CONSULT NOTE ADULT - ASSESSMENT
Impression:      Recommendations:         KENTRELL FLEMING is a 58y (1965) right handed woman with a PMHx significant for COPD, Meniere's disease s/p R labyrinthectomy, bipolar disorder who presents as code stroke for acute onset dizziness, difficulty ambulating, s/p tenecteplase administration in ED.    NIHSS: 1  LKW: 18:00  pre MRS: 3  Tenecteplase administered at 21:44  Not a thrombectomy candidate due to no LVO    Impression:  Acute vestibular syndrome, RUE numbness. Symptoms may be 2/2 peripheral etiology of vertigo noted to have have worsening dizziness with head turning, nystagmus on right gaze, history of Meniere's. However cannot rule out acute ischemic infarct, patient noted to have significant disability with ambulating, gait ataxia, s/p tenecteplase administration      Recommendations:    After tenecteplase administration (Started at 21:44 on 10/3):  -Frequent neuro-checks (q15min for 4h then q1h for 24h then q4h) and VS q2h  -No brown removal/placement for 24 hours, no venous/arterial puncture at non-compressible site  -No anticoagulation or anti-platelet agents for 24 hours  -Immobilization for 12 hours after tenecteplase, NPO for 6 hours after tenecteplase  -Repeat CTH in 24 hrs    Stroke acute management:  - Admit to stroke unit 4 [place] under  []  - BP goal < 180/105  - NPO unless passes dysphagia screen; swallow eval if fails  - STAT CTH non-contrast for change in neuro exam  - DVT ppx: SCDs    Secondary prevention of stroke:  -Hold AC/AP for now  -Atorvastatin 80 mg PO daily (long-term goal LDL < 70)  -Tight glucose control (long-term goal HgbA1c < 6%)  -Rehabilitation: PT/OT/SLP/SW/CM consults    Stroke workup:  -MRI brain w/o contrast  -TTE w/ bubble study  -Telemetry to monitor for arrhythmia  -Check HgbA1C, fasting lipid panel, Utox    Tenecteplase accept consent: The risks and benefits of IV Tenecteplase administration was discussed with patient/family in presence of ED staff and myself. Risks including but not limited to intracranial hemorrhage, major systemic hemorrhage in ~6%, 2% of patients, respectively. There is a 2.8% risk of intracerebral bleeding in patients treated in the 3-4.5 hour window (compared to 0.2% not treated with tenecteplase).  In addition, contraindications to tenecteplase were reviewed with patient and confirmed to not be present, allowing for administration of tenecteplase.      Delays in tenecteplase administration due to obtaining consent and delays in ED.     Case discussed with Telestroke attending Dr. Amada Dahl regarding decision for tenecteplase administration. To be seen and discussed by Stroke Attending on AM rounds, please await final attending attestation.  KENTRELL FLEMING is a 58y (1965) right handed woman with a PMHx significant for COPD, Meniere's disease s/p R labyrinthectomy, bipolar disorder who presents as code stroke for acute onset dizziness, difficulty ambulating, s/p tenecteplase administration in ED.    NIHSS: 1  LKW: 18:00  pre MRS: 3  Tenecteplase administered at 21:44  Not a thrombectomy candidate due to no LVO    Impression:  Acute vestibular syndrome, RUE numbness. Symptoms may be 2/2 peripheral etiology of vertigo noted to have have worsening dizziness with head turning, nystagmus on right gaze, history of Meniere's. However cannot rule out acute ischemic infarct, patient noted to have significant disability with ambulating, gait ataxia, s/p tenecteplase administration      Recommendations:    After tenecteplase administration (Started at 21:44 on 10/3):  -Frequent neuro-checks (q15min for 4h then q1h for 24h then q4h) and VS q2h  -No brown removal/placement for 24 hours, no venous/arterial puncture at non-compressible site  -No anticoagulation or anti-platelet agents for 24 hours  -Immobilization for 12 hours after tenecteplase, NPO for 6 hours after tenecteplase  -Repeat CTH in 24 hrs    Stroke acute management:  - BP goal < 180/105  - NPO unless passes dysphagia screen; swallow eval if fails  - STAT CTH non-contrast for change in neuro exam  - DVT ppx: SCDs    Secondary prevention of stroke:  -Hold AC/AP for now  -Atorvastatin 80 mg PO daily (long-term goal LDL < 70)  -Tight glucose control (long-term goal HgbA1c < 6%)  -Rehabilitation: PT/OT/SLP/SW/CM consults    Stroke workup:  -MRI brain w/o contrast  -TTE w/ bubble study  -Telemetry to monitor for arrhythmia  -Check HgbA1C, fasting lipid panel, Utox    Misc:  -Meclizine 25mf TID PRN for dizziness    Tenecteplase accept consent: The risks and benefits of IV Tenecteplase administration was discussed with patient/family in presence of ED staff and myself. Risks including but not limited to intracranial hemorrhage, major systemic hemorrhage in ~6%, 2% of patients, respectively. There is a 2.8% risk of intracerebral bleeding in patients treated in the 3-4.5 hour window (compared to 0.2% not treated with tenecteplase).  In addition, contraindications to tenecteplase were reviewed with patient and confirmed to not be present, allowing for administration of tenecteplase.      Delays in tenecteplase administration due to obtaining consent and delays in ED.     Case discussed with Telestroke attending Dr. Amada Dahl regarding decision for tenecteplase administration. To be seen and discussed by Stroke Attending on AM rounds, please await final attending attestation.

## 2023-10-04 LAB
AMPHET UR-MCNC: NEGATIVE — SIGNIFICANT CHANGE UP
BARBITURATES UR SCN-MCNC: NEGATIVE — SIGNIFICANT CHANGE UP
BENZODIAZ UR-MCNC: NEGATIVE — SIGNIFICANT CHANGE UP
CHOLEST SERPL-MCNC: 224 MG/DL — HIGH
COCAINE METAB.OTHER UR-MCNC: NEGATIVE — SIGNIFICANT CHANGE UP
CREATININE URINE RESULT, DAU: 36 MG/DL — SIGNIFICANT CHANGE UP
GLUCOSE BLDC GLUCOMTR-MCNC: 133 MG/DL — HIGH (ref 70–99)
HDLC SERPL-MCNC: 36 MG/DL — LOW
LIPID PNL WITH DIRECT LDL SERPL: 139 MG/DL — HIGH
MAGNESIUM SERPL-MCNC: 2 MG/DL — SIGNIFICANT CHANGE UP (ref 1.6–2.6)
METHADONE UR-MCNC: NEGATIVE — SIGNIFICANT CHANGE UP
NON HDL CHOLESTEROL: 188 MG/DL — HIGH
OPIATES UR-MCNC: NEGATIVE — SIGNIFICANT CHANGE UP
OXYCODONE UR-MCNC: NEGATIVE — SIGNIFICANT CHANGE UP
PCP SPEC-MCNC: SIGNIFICANT CHANGE UP
PCP UR-MCNC: NEGATIVE — SIGNIFICANT CHANGE UP
PHOSPHATE SERPL-MCNC: 3.9 MG/DL — SIGNIFICANT CHANGE UP (ref 2.5–4.5)
THC UR QL: NEGATIVE — SIGNIFICANT CHANGE UP
TRIGL SERPL-MCNC: 246 MG/DL — HIGH

## 2023-10-04 PROCEDURE — 70450 CT HEAD/BRAIN W/O DYE: CPT | Mod: 26,77

## 2023-10-04 PROCEDURE — 99291 CRITICAL CARE FIRST HOUR: CPT

## 2023-10-04 PROCEDURE — 99291 CRITICAL CARE FIRST HOUR: CPT | Mod: GC

## 2023-10-04 PROCEDURE — 70450 CT HEAD/BRAIN W/O DYE: CPT | Mod: 26

## 2023-10-04 PROCEDURE — 93306 TTE W/DOPPLER COMPLETE: CPT | Mod: 26

## 2023-10-04 RX ORDER — HYDROMORPHONE HYDROCHLORIDE 2 MG/ML
0.5 INJECTION INTRAMUSCULAR; INTRAVENOUS; SUBCUTANEOUS ONCE
Refills: 0 | Status: DISCONTINUED | OUTPATIENT
Start: 2023-10-04 | End: 2023-10-04

## 2023-10-04 RX ORDER — METOCLOPRAMIDE HCL 10 MG
10 TABLET ORAL ONCE
Refills: 0 | Status: DISCONTINUED | OUTPATIENT
Start: 2023-10-04 | End: 2023-10-04

## 2023-10-04 RX ORDER — ACETAMINOPHEN 500 MG
1000 TABLET ORAL ONCE
Refills: 0 | Status: COMPLETED | OUTPATIENT
Start: 2023-10-04 | End: 2023-10-04

## 2023-10-04 RX ORDER — CHLORHEXIDINE GLUCONATE 213 G/1000ML
1 SOLUTION TOPICAL DAILY
Refills: 0 | Status: DISCONTINUED | OUTPATIENT
Start: 2023-10-04 | End: 2023-10-07

## 2023-10-04 RX ORDER — ASPIRIN/CALCIUM CARB/MAGNESIUM 324 MG
81 TABLET ORAL DAILY
Refills: 0 | Status: DISCONTINUED | OUTPATIENT
Start: 2023-10-04 | End: 2023-10-10

## 2023-10-04 RX ORDER — CLOPIDOGREL BISULFATE 75 MG/1
75 TABLET, FILM COATED ORAL DAILY
Refills: 0 | Status: DISCONTINUED | OUTPATIENT
Start: 2023-10-04 | End: 2023-10-10

## 2023-10-04 RX ADMIN — PREGABALIN 1000 MICROGRAM(S): 225 CAPSULE ORAL at 12:24

## 2023-10-04 RX ADMIN — ARIPIPRAZOLE 20 MILLIGRAM(S): 15 TABLET ORAL at 12:24

## 2023-10-04 RX ADMIN — CHLORHEXIDINE GLUCONATE 1 APPLICATION(S): 213 SOLUTION TOPICAL at 18:48

## 2023-10-04 RX ADMIN — PANTOPRAZOLE SODIUM 40 MILLIGRAM(S): 20 TABLET, DELAYED RELEASE ORAL at 10:48

## 2023-10-04 RX ADMIN — Medication 20 MILLIGRAM(S): at 10:48

## 2023-10-04 RX ADMIN — Medication 25 MICROGRAM(S): at 10:49

## 2023-10-04 RX ADMIN — HYDROMORPHONE HYDROCHLORIDE 0.5 MILLIGRAM(S): 2 INJECTION INTRAMUSCULAR; INTRAVENOUS; SUBCUTANEOUS at 10:38

## 2023-10-04 RX ADMIN — Medication 25 MILLIGRAM(S): at 12:25

## 2023-10-04 RX ADMIN — HYDROMORPHONE HYDROCHLORIDE 0.5 MILLIGRAM(S): 2 INJECTION INTRAMUSCULAR; INTRAVENOUS; SUBCUTANEOUS at 10:50

## 2023-10-04 RX ADMIN — Medication 400 MILLIGRAM(S): at 08:57

## 2023-10-04 RX ADMIN — Medication 1 TABLET(S): at 10:49

## 2023-10-04 RX ADMIN — Medication 10 MILLIGRAM(S): at 12:25

## 2023-10-04 RX ADMIN — HYDROMORPHONE HYDROCHLORIDE 0.5 MILLIGRAM(S): 2 INJECTION INTRAMUSCULAR; INTRAVENOUS; SUBCUTANEOUS at 04:17

## 2023-10-04 RX ADMIN — ATORVASTATIN CALCIUM 40 MILLIGRAM(S): 80 TABLET, FILM COATED ORAL at 22:24

## 2023-10-04 RX ADMIN — BUDESONIDE AND FORMOTEROL FUMARATE DIHYDRATE 2 PUFF(S): 160; 4.5 AEROSOL RESPIRATORY (INHALATION) at 09:51

## 2023-10-04 RX ADMIN — Medication 1 TABLET(S): at 12:25

## 2023-10-04 RX ADMIN — Medication 1000 UNIT(S): at 12:25

## 2023-10-04 RX ADMIN — Medication 81 MILLIGRAM(S): at 22:23

## 2023-10-04 RX ADMIN — HYDROMORPHONE HYDROCHLORIDE 0.5 MILLIGRAM(S): 2 INJECTION INTRAMUSCULAR; INTRAVENOUS; SUBCUTANEOUS at 04:30

## 2023-10-04 RX ADMIN — LAMOTRIGINE 150 MILLIGRAM(S): 25 TABLET, ORALLY DISINTEGRATING ORAL at 12:25

## 2023-10-04 RX ADMIN — BUDESONIDE AND FORMOTEROL FUMARATE DIHYDRATE 2 PUFF(S): 160; 4.5 AEROSOL RESPIRATORY (INHALATION) at 20:06

## 2023-10-04 NOTE — PATIENT PROFILE ADULT - FALL HARM RISK - HARM RISK INTERVENTIONS
Assistance with ambulation/Communicate Risk of Fall with Harm to all staff/Monitor gait and stability/Reinforce activity limits and safety measures with patient and family/Tailored Fall Risk Interventions/Visual Cue: Yellow wristband and red socks/Bed in lowest position, wheels locked, appropriate side rails in place/Call bell, personal items and telephone in reach/Instruct patient to call for assistance before getting out of bed or chair/Non-slip footwear when patient is out of bed/[place] to call system/Physically safe environment - no spills, clutter or unnecessary equipment/Purposeful Proactive Rounding/Room/bathroom lighting operational, light cord in reach Assistance with ambulation/Assistance OOB with selected safe patient handling equipment/Communicate Risk of Fall with Harm to all staff/Discuss with provider need for PT consult/Monitor gait and stability/Provide patient with walking aids - walker, cane, crutches/Reinforce activity limits and safety measures with patient and family/Sit up slowly, dangle for a short time, stand at bedside before walking/Tailored Fall Risk Interventions/Visual Cue: Yellow wristband and red socks/Bed in lowest position, wheels locked, appropriate side rails in place/Call bell, personal items and telephone in reach/Instruct patient to call for assistance before getting out of bed or chair/Non-slip footwear when patient is out of bed/[place] to call system/Physically safe environment - no spills, clutter or unnecessary equipment/Purposeful Proactive Rounding/Room/bathroom lighting operational, light cord in reach

## 2023-10-04 NOTE — PROGRESS NOTE ADULT - ATTENDING COMMENTS
pt is a 59 yo female with hx copd, menieres disease, bipolar disorder  who presents with right hand and leg weakness started two hours prior  to  er visit, In the er ct scan nondiagnostic, pt sen by neurology and   tnk given for acute thrombotic stroke,  Asked to evaluate  pt s/p tenecteplase.   PE bp 120/74 rr 18 heent pupils 3 mm b/l reactive   no facial asymmetry , motor 5/5 lue, 3/5 rue,  5/5 lle, 2/5 lle  lungs clear heart s1s2 abdomen obese ext no edema    labs as above   wbc 9 hgb 12 hct 36 bicarb 26 cr 0.87   ct scans reviewed no ich,      A/P  59 yo female with acute thrombotic stroke with tenecteplase given  for acute cva,  -monitor pt closely, neuro check q 1 hrs,  -check echo to evaluate lv function  -check troponin, ekg   -monitor urine output,   -f/u vbg post bipap  -critically ill with acute cva s/p tnk Patient is a 59 yo F w/ COPD, Meniere's disease s/p R labyrinthectomy, bipolar disorder p/w vertigo, blurry vision, loss of balance when ambulating concern for acute CVA, admitted to MICU for frequent neuro checks after receiving TNK 10/3 944PM    #CVA - CTH/perfusion on presentation negative. s/p TNK 10/3 944PM. Endorses slightly improved RUE and RLE weakness. Still with sensory deficits. Complaint of HA since yesterday, worsening.  #Headache  #Hypoxemic respiratory failure - Desaturation during sleep, possible undiagnosed HERNAN. WIll need outpatient sleep study  - Repeat CTH now given headache  - Repeat CTH 930PM  - PT/OT eval  - Speech and swallow eval, passed bedside   - f/u lipid panel, Hb A1c  - TTE w/ bubble study  - MRI head  - BP goal < 180/105  - c/w statin  - Will hold ASA and chemical DVT ppx until 24 hour CTH  - SCDs for now    Zak Farris MD  Pulmonary & Critical Care

## 2023-10-04 NOTE — PHYSICAL THERAPY INITIAL EVALUATION ADULT - BED MOBILITY LIMITATIONS, REHAB EVAL
normal decreased ability to use legs for bridging/pushing/impaired ability to control trunk for mobility

## 2023-10-04 NOTE — SWALLOW BEDSIDE ASSESSMENT ADULT - ADDITIONAL RECOMMENDATIONS
Monitor for any neurological changes that may impact patient's PO intake. This department to follow up as schedule permits to assess for diet tolerance. Medical team further advised to reconsult if there is a change in medical status and/or observed change in patient's tolerance of recommended PO diet.

## 2023-10-04 NOTE — PHYSICAL THERAPY INITIAL EVALUATION ADULT - PERTINENT HX OF CURRENT PROBLEM, REHAB EVAL
58 year old right handed woman presents with vertigo, blurry vision, loss of balance when ambulating consistent for CVA, admitted to MICU for close neuro monitoring

## 2023-10-04 NOTE — SWALLOW BEDSIDE ASSESSMENT ADULT - COMMENTS
Progress Note- MICU 10/4: "58 year old right handed woman with a PMHx significant for COPD, Meniere's disease s/p R labyrinthectomy, bipolar disorder p/w vertigo, blurry vision, loss of balance when ambulating c/f CVA, admitted to MICU for close neuro monitoring."     Neuro Note 10/3: "Impression: Acute vestibular syndrome, RUE numbness. Symptoms may be 2/2 peripheral etiology of vertigo noted to have worsening dizziness with head turning, nystagmus on right gaze, history of Meniere's. However cannot rule out acute ischemic infarct, patient noted to have significant disability with ambulating, gait ataxia, s/p tenecteplase administration."     CT Head 10/4: "IMPRESSION: No acute intracranial hemorrhage, mass effect, or shift of the midline structures."    Patient seen awake/alert and oriented state during clinical swallow evaluation this PM. Patient denies difficulty swallowing and states she ate regular lunch tray "fine." Patient states she eats regular food at home such as chicken, rice, bread.

## 2023-10-04 NOTE — OCCUPATIONAL THERAPY INITIAL EVALUATION ADULT - DIAGNOSIS, OT EVAL
s/p code stroke; s/p TPA; ss/p s/p code stroke; s/p TPA; decreased ADL ability; decreased functional mobility

## 2023-10-04 NOTE — OCCUPATIONAL THERAPY INITIAL EVALUATION ADULT - ADDITIONAL COMMENTS
Patient lives in an apartment +elevator with son and granddaughter. She was independent in ADLs and required a rollator for functional mobility. She has a tub shower with a shower chair and grab bars.

## 2023-10-04 NOTE — PHYSICAL THERAPY INITIAL EVALUATION ADULT - ADDITIONAL COMMENTS
Patient lives with son and grand-daughter in apartment, + elevator access. Patient was independent prior to admission. Patient was using a rollator prior to admission.

## 2023-10-04 NOTE — SWALLOW BEDSIDE ASSESSMENT ADULT - SWALLOW EVAL: DIAGNOSIS
1- Functional oral stage for puree, regular solids, and thin liquids marked by adequate oral containment, adequate bolus manipulation, adequate mastication, adequate anterior to posterior transfer, adequate oral clearance. 2- Functional pharyngeal stage for puree, regular solids, and thin liquids marked by initiation of the pharyngeal swallow with hyolaryngeal excursion upon palpation without evidence of impaired airway protection.

## 2023-10-04 NOTE — CHART NOTE - NSCHARTNOTEFT_GEN_A_CORE
Prescriptions Dispensed in [place]  Patient Demographic Information (PDI)       PDI	First Name	Last Name	Birth Date	Gender	Street Address	[facility] Code  GARO FLEMING	1965	[facility]	[address]    Prescription Information      PDI Filter:    PDI	Current Rx	Rx Written	Rx Dispensed	Drug	Strength	Quantity  A	N	10/06/2022	10/06/2022	DIAZEPAM 10 MG TABLET		1.0  Days Supply 30  Prescriber Name TAQUERIA HINKLE  Payment Method Medicare  Dispenser [facility]  A	N	11/01/2022	11/01/2022	METHYLPHENIDATE 20 MG TABLET		30.0  Days Supply 30  Prescriber Name CHHAYA SMITH  Payment Method Insurance  Dispenser [facility]  A	N	11/22/2022	11/22/2022	DIAZEPAM 5 MG TABLET		10.0  Days Supply 3  Prescriber Name LASHAWN HIRSCH  Payment Method Medicare Dispenser [facility]  A	N	12/07/2022	12/07/2022	METHYLPHENIDATE 20 MG TABLET		30.0  Days Supply 30  Prescriber Name CHHAYA SMITH  Payment Method Insurance  [facility]  A	N	01/05/2023	01/06/2023	METHYLPHENIDATE 20 MG TABLET		30.0  Days Supply 30  Prescriber Name CHHAYA SMITH  Payment Method [facility]  A	N	01/09/2023	01/09/2023	TRAMADOL HCL 50 MG TABLET		10.0  Days Supply 3  Prescriber Name ARRON VALENTINE  Payment Method Insurance  Dispenser [facility]  A	N	02/07/2023	02/07/2023	METHYLPHENIDATE 20 MG TABLET		30.0  Days Supply 30  Prescriber Name CHHAYA SMITH  [facility]  [facility]  A	N	03/07/2023	03/07/2023	METHYLPHENIDATE 20 MG TABLET		30.0  Days Supply 30  Prescriber Name CHHAYA SMITH  Payment Method Insurance  [facility]  A	N	04/05/2023	04/05/2023	METHYLPHENIDATE 20 MG TABLET		30.0  Days Supply 30  Prescriber Name CHHAYA SMITH  Payment Method Medicare Dispenser [facility]  A	N	04/13/2023	04/13/2023	OXYCODONE HCL (IR) 5 MG TABLET		10.0  Days Supply 1  Prescriber Name ISAIAS DELEON  Payment Method Insurance  [facility]  A	N	05/16/2023	05/18/2023	METHYLPHENIDATE 20 MG TABLET		30.0  Days Supply 30  Prescriber Name CHHAYA SMITH  Payment Method Insurance  [facility]  A	N	06/06/2023	06/18/2023	METHYLPHENIDATE 20 MG TABLET		30.0  Days Supply 30  Prescriber Name CHHAYA SMITH  Payment Method Medicare Dispenser [facility]  A	N	10/04/2022	10/04/2022	METHYLPHENIDATE 20 MG TABLET		30.0  Days Supply 30  Prescriber Name CHHAYA SMITH  Payment Method Medicare Dispens[facility]  A	Y	09/22/2023	09/22/2023	METHYLPHENIDATE 20 MG TABLET		30.0  Days Supply 30  Prescriber Name CHHAYA SMITH  Payment Method Medicare Dispens[facility]  A	N	08/21/2023	08/21/2023	METHYLPHENIDATE 20 MG TABLET		30.0  Days Supply 30  Prescriber Name NITZA LO  Payment Method Medicare Dispens[facility]  A	N	07/18/2023	07/19/2023	METHYLPHENIDATE 20 MG TABLET		30.0  Days Supply 30  Prescriber Name NITZA LO  Payment Method Medicare Dispens[facility]      Patient Demographic Information (PDI)       PDI	First Name	Last Name	Birth Date	Gender	Street Address	[facility] Code  AGRO FLEMING	1965	Female	[address] [place] [id number]    Prescription Information      PDI Filter:    PDI	Current Rx	Rx Written	Rx Dispensed	Drug	Strength	Quantity	Days Supply  A	N	03/20/2023	03/20/2023	OXYCODONE HCL (IR) 5 MG TABLET		15.0	4  Prescriber Name GWEN MITCHELL  Payment Method Insurance  Dispenser [facility].

## 2023-10-04 NOTE — OCCUPATIONAL THERAPY INITIAL EVALUATION ADULT - NSOTDISCHREC_GEN_A_CORE
Inpatient rehab in order to improve ADL abilities and functional mobility. OT will continue to follow.

## 2023-10-04 NOTE — PATIENT PROFILE ADULT - CAREGIVER NAME
Daniel Daniel  Daniel -  son  [phone].      Zachery  -son    [phone].   Geremias-- son-- [phone]

## 2023-10-04 NOTE — PROGRESS NOTE ADULT - ASSESSMENT
58 year old right handed woman with a PMHx significant for COPD, Meniere's disease s/p R labyrinthectomy, bipolar disorder p/w vertigo, blurry vision, loss of balance when ambulating c/f CVA, admitted to MICU for close neuro monitoring    ======NEURO======  #CVA  - NIHSS 1 on admission (10/3); s/p tenecteplase in the ED (21:44 on 10/3)  - q1h neuro check;   - BP goal < 180/105;   - Telemetry  - CTH and CTA brain/neck (10/3) unrevealing  - f/u Repeat CTH in 24 hours   - f/u MRI brain w/o con  - f/u A1c, Lipid profile, Utox  - f/u TTE w/ bubble study  - c/w Atorvastatin 80mg qd  - f/u Neurology recs    #Meniere's disease  - s/p R labyrinthectomy    ======CV======  #No active issues  - BP goal < 180/105    ======PULM======  #COPD  - Not in acute exacerbation  - c/w home medications    ======RENAL======  #No active issues  - Trend sCr    ======GI======  #No active issues  - Keep NPO, pending swallow eval    ======ENDO======  #No active issues  - f/u A1c, Lipid profile  - c/w Atorvastatin 80mg QD  - ISS    ======METABOLIC======  #No active issues    ======HEMATOLOGIC======  #DVT ppx  - s/p tenecteplase in the ED (21:44 on 10/3)  - SCDs  ======ID======  #No active issues    ======Ethics/Prophylaxis======  Code: Full code  DVT: SCDs       58 year old right handed woman with a PMHx significant for COPD, Meniere's disease s/p R labyrinthectomy, bipolar disorder p/w vertigo, blurry vision, loss of balance when ambulating c/f CVA, admitted to MICU for close neuro monitoring    ======NEURO======  #CVA  - NIHSS 1 on admission (10/3); s/p tenecteplase in the ED (21:44 on 10/3)  - q1h neuro check;   - BP goal < 180/105;   - Telemetry  - CTH and CTA brain/neck (10/3) unrevealing  - Repeated CTH 2/2 concern for new headache; f/u CTH did not show any new intracranial hemorrhage.  - f/u Repeat CTH in 24 hours   - f/u MRI brain w/o con  - f/u A1c, Lipid profile, Utox  - f/u TTE w/ bubble study  - c/w Atorvastatin 80mg qd  - f/u Neurology recs    #Meniere's disease  - s/p R labyrinthectomy    #Headaches  - Given Dilaudid 0.5 with little relief  - Repeated CTH w/no new intracranial hemorrhage.     ======CV======  #No active issues  - BP goal < 180/105    ======PULM======  #COPD  - Not in acute exacerbation  - c/w home medications    ======RENAL======  #No active issues  - Trend sCr    ======GI======  #No active issues  - Keep NPO, pending swallow eval    ======ENDO======  #No active issues  - f/u A1c, Lipid profile  - c/w Atorvastatin 80mg QD  - ISS    ======METABOLIC======  #No active issues    ======HEMATOLOGIC======  #DVT ppx  - s/p tenecteplase in the ED (21:44 on 10/3)  - SCDs  ======ID======  #No active issues    ======Ethics/Prophylaxis======  Code: Full code  DVT: SCDs

## 2023-10-04 NOTE — SWALLOW BEDSIDE ASSESSMENT ADULT - SWALLOW EVAL: CURRENT DIET
Addended by: ERIN QUIROZ on: 7/13/2021 01:36 PM     Modules accepted: Orders    
Regular Solids with Thin Liquids

## 2023-10-04 NOTE — OCCUPATIONAL THERAPY INITIAL EVALUATION ADULT - GENERAL OBSERVATIONS, REHAB EVAL
Patient received semisupine in bed in NAD. +IV +telemetry . Patient agrees to participate in OT evaluation. /67mmHg . Patient left semisupine in bed in NAD. Patient tolerated OT evaluation well.

## 2023-10-04 NOTE — SWALLOW BEDSIDE ASSESSMENT ADULT - ASR SWALLOW RECOMMEND DIAG
Objective testing not warranted at this time given no overt signs of aspiration and CT Head 10/4 without acute findings

## 2023-10-05 LAB
A1C WITH ESTIMATED AVERAGE GLUCOSE RESULT: 5.9 % — HIGH (ref 4–5.6)
ALBUMIN SERPL ELPH-MCNC: 3.9 G/DL — SIGNIFICANT CHANGE UP (ref 3.3–5)
ALP SERPL-CCNC: 117 U/L — SIGNIFICANT CHANGE UP (ref 40–120)
ALT FLD-CCNC: 57 U/L — HIGH (ref 4–33)
ANION GAP SERPL CALC-SCNC: 13 MMOL/L — SIGNIFICANT CHANGE UP (ref 7–14)
APTT BLD: 34.5 SEC — SIGNIFICANT CHANGE UP (ref 24.5–35.6)
AST SERPL-CCNC: 44 U/L — HIGH (ref 4–32)
BASOPHILS # BLD AUTO: 0.01 K/UL — SIGNIFICANT CHANGE UP (ref 0–0.2)
BASOPHILS NFR BLD AUTO: 0.2 % — SIGNIFICANT CHANGE UP (ref 0–2)
BILIRUB SERPL-MCNC: 0.4 MG/DL — SIGNIFICANT CHANGE UP (ref 0.2–1.2)
BUN SERPL-MCNC: 13 MG/DL — SIGNIFICANT CHANGE UP (ref 7–23)
CALCIUM SERPL-MCNC: 9 MG/DL — SIGNIFICANT CHANGE UP (ref 8.4–10.5)
CHLORIDE SERPL-SCNC: 99 MMOL/L — SIGNIFICANT CHANGE UP (ref 98–107)
CHOLEST SERPL-MCNC: 205 MG/DL — HIGH
CO2 SERPL-SCNC: 28 MMOL/L — SIGNIFICANT CHANGE UP (ref 22–31)
CREAT SERPL-MCNC: 0.83 MG/DL — SIGNIFICANT CHANGE UP (ref 0.5–1.3)
EGFR: 82 ML/MIN/1.73M2 — SIGNIFICANT CHANGE UP
EOSINOPHIL # BLD AUTO: 0.13 K/UL — SIGNIFICANT CHANGE UP (ref 0–0.5)
EOSINOPHIL NFR BLD AUTO: 2.6 % — SIGNIFICANT CHANGE UP (ref 0–6)
ESTIMATED AVERAGE GLUCOSE: 123 — SIGNIFICANT CHANGE UP
GLUCOSE SERPL-MCNC: 133 MG/DL — HIGH (ref 70–99)
HCT VFR BLD CALC: 37.9 % — SIGNIFICANT CHANGE UP (ref 34.5–45)
HDLC SERPL-MCNC: 33 MG/DL — LOW
HGB BLD-MCNC: 11.8 G/DL — SIGNIFICANT CHANGE UP (ref 11.5–15.5)
IANC: 2.76 K/UL — SIGNIFICANT CHANGE UP (ref 1.8–7.4)
IMM GRANULOCYTES NFR BLD AUTO: 0.8 % — SIGNIFICANT CHANGE UP (ref 0–0.9)
INR BLD: 0.92 RATIO — SIGNIFICANT CHANGE UP (ref 0.85–1.18)
LIDOCAIN IGE QN: 24 U/L — SIGNIFICANT CHANGE UP (ref 7–60)
LIPID PNL WITH DIRECT LDL SERPL: 132 MG/DL — HIGH
LYMPHOCYTES # BLD AUTO: 1.6 K/UL — SIGNIFICANT CHANGE UP (ref 1–3.3)
LYMPHOCYTES # BLD AUTO: 32.2 % — SIGNIFICANT CHANGE UP (ref 13–44)
MAGNESIUM SERPL-MCNC: 2.2 MG/DL — SIGNIFICANT CHANGE UP (ref 1.6–2.6)
MCHC RBC-ENTMCNC: 26.8 PG — LOW (ref 27–34)
MCHC RBC-ENTMCNC: 31.1 GM/DL — LOW (ref 32–36)
MCV RBC AUTO: 85.9 FL — SIGNIFICANT CHANGE UP (ref 80–100)
MONOCYTES # BLD AUTO: 0.43 K/UL — SIGNIFICANT CHANGE UP (ref 0–0.9)
MONOCYTES NFR BLD AUTO: 8.7 % — SIGNIFICANT CHANGE UP (ref 2–14)
NEUTROPHILS # BLD AUTO: 2.76 K/UL — SIGNIFICANT CHANGE UP (ref 1.8–7.4)
NEUTROPHILS NFR BLD AUTO: 55.5 % — SIGNIFICANT CHANGE UP (ref 43–77)
NON HDL CHOLESTEROL: 172 MG/DL — HIGH
NRBC # BLD: 0 /100 WBCS — SIGNIFICANT CHANGE UP (ref 0–0)
NRBC # FLD: 0 K/UL — SIGNIFICANT CHANGE UP (ref 0–0)
PHOSPHATE SERPL-MCNC: 3.6 MG/DL — SIGNIFICANT CHANGE UP (ref 2.5–4.5)
PLATELET # BLD AUTO: 285 K/UL — SIGNIFICANT CHANGE UP (ref 150–400)
POTASSIUM SERPL-MCNC: 4.3 MMOL/L — SIGNIFICANT CHANGE UP (ref 3.5–5.3)
POTASSIUM SERPL-SCNC: 4.3 MMOL/L — SIGNIFICANT CHANGE UP (ref 3.5–5.3)
PROT SERPL-MCNC: 6.6 G/DL — SIGNIFICANT CHANGE UP (ref 6–8.3)
PROTHROM AB SERPL-ACNC: 10.3 SEC — SIGNIFICANT CHANGE UP (ref 9.5–13)
RBC # BLD: 4.41 M/UL — SIGNIFICANT CHANGE UP (ref 3.8–5.2)
RBC # FLD: 17.5 % — HIGH (ref 10.3–14.5)
SODIUM SERPL-SCNC: 140 MMOL/L — SIGNIFICANT CHANGE UP (ref 135–145)
TRIGL SERPL-MCNC: 198 MG/DL — HIGH
TSH SERPL-MCNC: 1.38 UIU/ML — SIGNIFICANT CHANGE UP (ref 0.27–4.2)
WBC # BLD: 4.97 K/UL — SIGNIFICANT CHANGE UP (ref 3.8–10.5)
WBC # FLD AUTO: 4.97 K/UL — SIGNIFICANT CHANGE UP (ref 3.8–10.5)

## 2023-10-05 PROCEDURE — 99232 SBSQ HOSP IP/OBS MODERATE 35: CPT

## 2023-10-05 PROCEDURE — 99233 SBSQ HOSP IP/OBS HIGH 50: CPT | Mod: GC

## 2023-10-05 RX ORDER — ACETAMINOPHEN 500 MG
1000 TABLET ORAL ONCE
Refills: 0 | Status: COMPLETED | OUTPATIENT
Start: 2023-10-05 | End: 2023-10-05

## 2023-10-05 RX ADMIN — Medication 1000 UNIT(S): at 11:07

## 2023-10-05 RX ADMIN — CHLORHEXIDINE GLUCONATE 1 APPLICATION(S): 213 SOLUTION TOPICAL at 11:06

## 2023-10-05 RX ADMIN — Medication 25 MICROGRAM(S): at 06:05

## 2023-10-05 RX ADMIN — Medication 10 MILLIGRAM(S): at 11:07

## 2023-10-05 RX ADMIN — Medication 25 MILLIGRAM(S): at 11:08

## 2023-10-05 RX ADMIN — Medication 81 MILLIGRAM(S): at 21:05

## 2023-10-05 RX ADMIN — Medication 400 MILLIGRAM(S): at 19:00

## 2023-10-05 RX ADMIN — LAMOTRIGINE 150 MILLIGRAM(S): 25 TABLET, ORALLY DISINTEGRATING ORAL at 11:07

## 2023-10-05 RX ADMIN — Medication 20 MILLIGRAM(S): at 06:05

## 2023-10-05 RX ADMIN — PANTOPRAZOLE SODIUM 40 MILLIGRAM(S): 20 TABLET, DELAYED RELEASE ORAL at 06:05

## 2023-10-05 RX ADMIN — Medication 1000 MILLIGRAM(S): at 19:00

## 2023-10-05 RX ADMIN — PREGABALIN 1000 MICROGRAM(S): 225 CAPSULE ORAL at 11:07

## 2023-10-05 RX ADMIN — Medication 1 TABLET(S): at 11:08

## 2023-10-05 RX ADMIN — ARIPIPRAZOLE 20 MILLIGRAM(S): 15 TABLET ORAL at 11:07

## 2023-10-05 RX ADMIN — CLOPIDOGREL BISULFATE 75 MILLIGRAM(S): 75 TABLET, FILM COATED ORAL at 11:08

## 2023-10-05 RX ADMIN — BUDESONIDE AND FORMOTEROL FUMARATE DIHYDRATE 2 PUFF(S): 160; 4.5 AEROSOL RESPIRATORY (INHALATION) at 22:43

## 2023-10-05 RX ADMIN — ATORVASTATIN CALCIUM 40 MILLIGRAM(S): 80 TABLET, FILM COATED ORAL at 21:06

## 2023-10-05 RX ADMIN — Medication 1 TABLET(S): at 06:43

## 2023-10-05 RX ADMIN — Medication 20 MILLIGRAM(S): at 11:54

## 2023-10-05 NOTE — PROGRESS NOTE ADULT - ATTENDING COMMENTS
I saw and examined the patient on stroke rounds.      Patient now hospital day 3 after presenting with vertigo, and R sided weakness, receiving tenecteplase in the ED.   Patient endorsing improvement in symptoms.  The vertigo is less severe, and the R arm and leg have improved greatly, but she has not returned to her prior baseline.   She did walk with therapy this AM, and required some assistance.     On exam:   GEN; NAD  CV: rRR, S1, S2  PULM: CTAB  NEURO:   Awake, alert, oriented to hospital and year, follows commands, normal naming, and repetition.   Pupils 3-2mm, symmetric, full Vf's, conjugate gaze, with full EOM, no facial weakness, no dysarthria, tongue midline.   MOTOR: normal bulk and tone.  R arm drift.  Trace asymmetry in , L>R, biceps 4+/5 triceps 4+/5.  L arm is 4+/5 deltoids, 5/5 biceps, triceps, and .  R hip flexors 4+/5, knee extensors 4+_/5.  L is 5/5 throughout  SENSORY: intact symmetrically to light touch  COORDINATION: normal FNF    CTH images reviewed: no hemorrhage.  No large territorial infarct.   CTA H&N images reviewed: no flow limiting stenosis cervical carotid or vertebral, no large vessel occlusion.     AP: 58 year old woman with COPD, Menierre's, presenting on 10/3 with vertigo, and R sided arm and leg weakness, received TNK in ED.  Improving symptoms.  On exam, still has R arm drift, and trace weakness in the  and biceps.  Also weak on R hip flexion, and knee extension.  Imaging thus far unremarkable, and no changes on follow up imaging. Normal EF on echo, but limited study.    -FU MRI brain  -continue on dual antiplatelet therapy  -continue lipitor 40mg daily  -gradually bring to normotension  -neurology to follow. Please page or call with any acute neuro changes.

## 2023-10-05 NOTE — PROGRESS NOTE ADULT - ASSESSMENT
KENTRELL FLEMING is a 58y (1965) right handed woman with a PMHx significant for COPD, Meniere's disease s/p R labyrinthectomy, bipolar disorder who presents as code stroke for acute onset dizziness, difficulty ambulating, s/p tenecteplase administration in ED.    NIHSS: 1  LKW: 18:00  pre MRS: 3  Tenecteplase administered at 21:44  Not a thrombectomy candidate due to no LVO    Impression:  Acute vestibular syndrome, RUE numbness. Symptoms may be 2/2 peripheral etiology of vertigo noted to have have worsening dizziness with head turning, nystagmus on right gaze, history of Meniere's. However cannot rule out acute ischemic infarct, patient noted to have significant disability with ambulating, gait ataxia, s/p tenecteplase administration      Recommendations:    After tenecteplase administration (Started at 21:44 on 10/3):  -Frequent neuro-checks (q15min for 4h then q1h for 24h then q4h) and VS q2h  -No brown removal/placement for 24 hours, no venous/arterial puncture at non-compressible site  -No anticoagulation or anti-platelet agents for 24 hours  -Immobilization for 12 hours after tenecteplase, NPO for 6 hours after tenecteplase  -Repeat CTH in 24 hrs --> no interim change    Stroke acute management:  - BP goal < 180/105  - NPO unless passes dysphagia screen; swallow eval if fails  - STAT CTH non-contrast for change in neuro exam --> no acute change in AM on 10/4/23  - DVT ppx: SCDs    Secondary prevention of stroke:  -Hold AC/AP for now  -Atorvastatin 80 mg PO daily (long-term goal LDL < 70)  -Tight glucose control (long-term goal HgbA1c < 6%)  -Rehabilitation: PT/OT/SLP/SW/CM consults    Stroke workup:  -MRI brain w/o contrast (pending)  -TTE w/ bubble study  -Telemetry to monitor for arrhythmia  -Check HgbA1C, fasting lipid panel, Utox    Misc:  -Meclizine 25mf TID PRN for dizziness    Case discussed and seen at bedside w/ attending Dr. Menezes

## 2023-10-05 NOTE — PROGRESS NOTE ADULT - ASSESSMENT
58 year old right handed woman with a PMHx significant for COPD, Meniere's disease s/p R labyrinthectomy, bipolar disorder p/w vertigo, blurry vision, loss of balance when ambulating c/f CVA, admitted to MICU for close neuro monitoring    ======NEURO======  #CVA  - NIHSS 1 on admission (10/3); s/p tenecteplase in the ED (21:44 on 10/3)  - q1h neuro check;   - BP goal < 180/105;   - Telemetry  - CTH and CTA brain/neck (10/3) unrevealing  - Repeated CTH 2/2 concern for new headache; f/u CTH did not show any new intracranial hemorrhage.  - f/u Repeat CTH in 24 hours   - f/u MRI brain w/o con  - f/u A1c, Lipid profile, Utox  - f/u TTE w/ bubble study  - c/w Atorvastatin 80mg qd  - f/u Neurology recs    #Meniere's disease  - s/p R labyrinthectomy    #Headaches  - Given Dilaudid 0.5 with little relief  - Repeated CTH w/no new intracranial hemorrhage.     ======CV======  #No active issues  - BP goal < 180/105    ======PULM======  #COPD  - Not in acute exacerbation  - c/w home medications    ======RENAL======  #No active issues  - Trend sCr    ======GI======  #No active issues  - Keep NPO, pending swallow eval    ======ENDO======  #No active issues  - f/u A1c, Lipid profile  - c/w Atorvastatin 80mg QD  - ISS    ======METABOLIC======  #No active issues    ======HEMATOLOGIC======  #DVT ppx  - s/p tenecteplase in the ED (21:44 on 10/3)  - SCDs  ======ID======  #No active issues    ======Ethics/Prophylaxis======  Code: Full code  DVT: SCDs       58 year old right handed woman with a PMHx significant for COPD, Meniere's disease s/p R labyrinthectomy, bipolar disorder p/w vertigo, blurry vision, loss of balance when ambulating c/f CVA, admitted to MICU for close neuro monitoring. HDS and is pending transfer to neuro telemetry floor when bed available.    ======NEURO======  #CVA  - NIHSS 1 on admission (10/3); s/p tenecteplase in the ED (21:44 on 10/3)  - q1h neuro check;   - BP goal < 180/105;   - Telemetry  - CTH and CTA brain/neck (10/3) unrevealing  - Repeated CTH 2/2 concern for new headache; f/u CTH did not show any new intracranial hemorrhage.  - f/u Repeat CTHx2 on 10/4 negative for IC bleeding  - prediabetic A1c, Lipid profile w/ some elevations, Utox negative  - Limited TTE 2/2 poor quality, but f/u w/ bubble study  - c/w Atorvastatin 80mg qd & DAPT per neuro recs  - f/u Neurology recs  - f/u MRI brain w/o con when able    #Meniere's disease  - s/p R labyrinthectomy    #Headaches  - Given Dilaudid 0.5 with significant improvement over 24 hrs  - Repeated CTHx2 w/no new intracranial hemorrhage.     ======CV======  #No active issues  - BP goal < 180/105    ======PULM======  #COPD  - Not in acute exacerbation  - c/w home medications    ======RENAL======  #No active issues  - Trend sCr, WNL 0.83 most recently    ======GI======  #No active issues  - Keep NPO, pending swallow eval    ======ENDO======  #No active issues  - prediabetic 5.9 A1C%  - C/w Atorvastatin 80mg QD  - ISS    ======METABOLIC======  #No active issues    ======HEMATOLOGIC======  #DVT ppx  - s/p tenecteplase in the ED (21:44 on 10/3)  - SCDs & also DAPT now per neuro recs    ======ID======  #No active issues    ======Ethics/Prophylaxis======  Code: Full code  DVT: SCDs

## 2023-10-05 NOTE — CHART NOTE - NSCHARTNOTEFT_GEN_A_CORE
HPI: Patient KENTRELL FLEMING is a 58y (1965) right handed woman with a PMHx significant for COPD, Meniere's disease s/p R labyrinthectomy, bipolar disorder who presented as code stroke for acute onset dizziness, difficulty ambulating. Reported intermittent episodes of room spinning dizziness, feels unsteady when ambulating.  Not as severe as when she has her vertigo from Meniere's Denies fall or headstrike. Reports last time she has an episode of vertigo was in April before her labyrinthectomy. Patient also noted to have RUE numbness on exam.  Denied headache. Initially reported blurry vision but reports now although reported feels like she is in a fog. Denied word finding difficulty, no facial droop or slurred speech noted. Denies focal weakness, visual deficit or diplopia.  Nystagmus on R gaze initally. Feels dizzy when looking up and rightwards Not on AC/AP. Appears to be veering to R when ambulating as well on presentation.     MICU Course:  Accepted to MICU after tenecteplase was used in ED for presumed stroke. CTH negative x3 over last 36 hours. HA and R sided weakness significantly improved and continues to improve w/o any new or reemerging symptoms. Still w/o focal deficits or other neurologic findings/symptoms. Per neuro recs, started on DAPT (clopidogrel and ASA) and plan for f/u MRI and neuro checks while on tele for arrythmia monitoring.     MICU Transfer Note  ---------------------------    Transfer from: MICU  Transfer to:  (  ) Medicine    ( x ) Telemetry    (  ) RCU    (  ) Palliative    (  ) Stroke Unit    (  ) _______________  Accepting Physician:          OBJECTIVE --  Vital Signs Last 24 Hrs  T(C): 33.3 (05 Oct 2023 07:49), Max: 36.7 (05 Oct 2023 05:30)  T(F): 92 (05 Oct 2023 07:49), Max: 98 (05 Oct 2023 05:30)  HR: 84 (05 Oct 2023 12:00) (69 - 84)  BP: 116/75 (05 Oct 2023 12:00) (105/63 - 125/77)  BP(mean): 83 (05 Oct 2023 12:00) (71 - 103)  RR: 22 (05 Oct 2023 12:00) (13 - 22)  SpO2: 97% (05 Oct 2023 12:00) (93% - 99%)    Parameters below as of 05 Oct 2023 12:00  Patient On (Oxygen Delivery Method): room air        I&O's Summary    04 Oct 2023 07:01  -  05 Oct 2023 07:00  --------------------------------------------------------  IN: 1500 mL / OUT: 1800 mL / NET: -300 mL        MEDICATIONS  (STANDING):  amitriptyline 25 milliGRAM(s) Oral daily  ARIPiprazole 20 milliGRAM(s) Oral daily  aspirin  chewable 81 milliGRAM(s) Oral daily  atorvastatin 40 milliGRAM(s) Oral at bedtime  budesonide  80 MICROgram(s)/formoterol 4.5 MICROgram(s) Inhaler 2 Puff(s) Inhalation two times a day  chlorhexidine 2% Cloths 1 Application(s) Topical daily  cholecalciferol 1000 Unit(s) Oral daily  clopidogrel Tablet 75 milliGRAM(s) Oral daily  cyanocobalamin 1000 MICROGram(s) Oral daily  FLUoxetine 10 milliGRAM(s) Oral daily  furosemide    Tablet 20 milliGRAM(s) Oral daily  lamoTRIgine 150 milliGRAM(s) Oral daily  levothyroxine 25 MICROGram(s) Oral daily  methylphenidate 20 milliGRAM(s) Oral daily  multivitamin 1 Tablet(s) Oral daily  pantoprazole    Tablet 40 milliGRAM(s) Oral before breakfast  triamterene 37.5 mG/hydrochlorothiazide 25 mG Tablet 1 Tablet(s) Oral daily    MEDICATIONS  (PRN):  albuterol/ipratropium for Nebulization 3 milliLiter(s) Nebulizer every 6 hours PRN Shortness of Breath and/or Wheezing        LABS                                            11.8                  Neurophils% (auto):   55.5   (10-05 @ 01:55):    4.97 )-----------(285          Lymphocytes% (auto):  32.2                                          37.9                   Eosinphils% (auto):   2.6      Manual%: Neutrophils x    ; Lymphocytes x    ; Eosinophils x    ; Bands%: x    ; Blasts x                                    140    |  99     |  13                  Calcium: 9.0   / iCa: x      (10-05 @ 01:55)    ----------------------------<  133       Magnesium: 2.20                             4.3     |  28     |  0.83             Phosphorous: 3.6      TPro  6.6    /  Alb  3.9    /  TBili  0.4    /  DBili  x      /  AST  44     /  ALT  57     /  AlkPhos  117    05 Oct 2023 01:55    ( 10-05 @ 01:55 )   PT: 10.3 sec;   INR: 0.92 ratio  aPTT: 34.5 sec          ASSESSMENT & PLAN:   · Assessment	  58 year old right handed woman with a PMHx significant for COPD, Meniere's disease s/p R labyrinthectomy, bipolar disorder p/w vertigo, blurry vision, loss of balance when ambulating c/f CVA, admitted to MICU for close neuro monitoring. HDS and is pending transfer to neuro telemetry floor when bed available.    ======NEURO======  #CVA  - NIHSS 1 on admission (10/3); s/p tenecteplase in the ED (21:44 on 10/3)  - q1h neuro check;   - BP goal < 180/105;   - Telemetry  - CTH and CTA brain/neck (10/3) unrevealing  - Repeated CTH 2/2 concern for new headache; f/u CTH did not show any new intracranial hemorrhage.  - f/u Repeat CTHx2 on 10/4 negative for IC bleeding  - prediabetic A1c, Lipid profile w/ some elevations, Utox negative  - Limited TTE 2/2 poor quality, but f/u w/ bubble study  - c/w Atorvastatin 80mg qd & DAPT per neuro recs  - f/u Neurology recs  - f/u MRI brain w/o con when able    #Meniere's disease  - s/p R labyrinthectomy    #Headaches  - Given Dilaudid 0.5 with significant improvement over 24 hrs  - Repeated CTHx2 w/no new intracranial hemorrhage.     ======CV======  #No active issues  - BP goal < 180/105    ======PULM======  #COPD  - Not in acute exacerbation  - c/w home medications    ======RENAL======  #No active issues  - Trend sCr, WNL 0.83 most recently    ======GI======  #No active issues  - Keep NPO, pending swallow eval    ======ENDO======  #No active issues  - prediabetic 5.9 A1C%  - C/w Atorvastatin 80mg QD  - ISS    ======METABOLIC======  #No active issues    ======HEMATOLOGIC======  #DVT ppx  - s/p tenecteplase in the ED (21:44 on 10/3)  - SCDs & also DAPT now per neuro recs    ======ID======  #No active issues    ======Ethics/Prophylaxis======  Code: Full code  DVT: SCDs            For Follow-Up:  [ ] F/u MRI when able   [ ] F/u TTE w/ bubble study   [ ] F/u formal s/swallow HPI: Patient KENTRELL FLEMING is a 58y (1965) right handed woman with a PMHx significant for COPD, Meniere's disease s/p R labyrinthectomy, bipolar disorder who presented as code stroke for acute onset dizziness, difficulty ambulating. Reported intermittent episodes of room spinning dizziness, feels unsteady when ambulating.  Not as severe as when she has her vertigo from Meniere's Denies fall or headstrike. Reports last time she has an episode of vertigo was in April before her labyrinthectomy. Patient also noted to have RUE numbness on exam.  Denied headache. Initially reported blurry vision but reports now although reported feels like she is in a fog. Denied word finding difficulty, no facial droop or slurred speech noted. Denies focal weakness, visual deficit or diplopia.  Nystagmus on R gaze initally. Feels dizzy when looking up and rightwards Not on AC/AP. Appears to be veering to R when ambulating as well on presentation.     MICU Course:  Accepted to MICU after tenecteplase was used in ED for presumed stroke. CTH negative x3 over last 36 hours. HA and R sided weakness significantly improved and continues to improve w/o any new or reemerging symptoms. Still w/o focal deficits or other neurologic findings/symptoms. Per neuro recs, started on DAPT (clopidogrel and ASA) and plan for f/u MRI and neuro checks while on tele for arrythmia monitoring.     MICU Transfer Note  ---------------------------    Transfer from: MICU  Transfer to:  (  ) Medicine    ( x ) Telemetry    (  ) RCU    (  ) Palliative    (  ) Stroke Unit    (  ) _______________  Accepting Physician: Dr. Richardson          OBJECTIVE --  Vital Signs Last 24 Hrs  T(C): 33.3 (05 Oct 2023 07:49), Max: 36.7 (05 Oct 2023 05:30)  T(F): 92 (05 Oct 2023 07:49), Max: 98 (05 Oct 2023 05:30)  HR: 84 (05 Oct 2023 12:00) (69 - 84)  BP: 116/75 (05 Oct 2023 12:00) (105/63 - 125/77)  BP(mean): 83 (05 Oct 2023 12:00) (71 - 103)  RR: 22 (05 Oct 2023 12:00) (13 - 22)  SpO2: 97% (05 Oct 2023 12:00) (93% - 99%)    Parameters below as of 05 Oct 2023 12:00  Patient On (Oxygen Delivery Method): room air        I&O's Summary    04 Oct 2023 07:01  -  05 Oct 2023 07:00  --------------------------------------------------------  IN: 1500 mL / OUT: 1800 mL / NET: -300 mL        MEDICATIONS  (STANDING):  amitriptyline 25 milliGRAM(s) Oral daily  ARIPiprazole 20 milliGRAM(s) Oral daily  aspirin  chewable 81 milliGRAM(s) Oral daily  atorvastatin 40 milliGRAM(s) Oral at bedtime  budesonide  80 MICROgram(s)/formoterol 4.5 MICROgram(s) Inhaler 2 Puff(s) Inhalation two times a day  chlorhexidine 2% Cloths 1 Application(s) Topical daily  cholecalciferol 1000 Unit(s) Oral daily  clopidogrel Tablet 75 milliGRAM(s) Oral daily  cyanocobalamin 1000 MICROGram(s) Oral daily  FLUoxetine 10 milliGRAM(s) Oral daily  furosemide    Tablet 20 milliGRAM(s) Oral daily  lamoTRIgine 150 milliGRAM(s) Oral daily  levothyroxine 25 MICROGram(s) Oral daily  methylphenidate 20 milliGRAM(s) Oral daily  multivitamin 1 Tablet(s) Oral daily  pantoprazole    Tablet 40 milliGRAM(s) Oral before breakfast  triamterene 37.5 mG/hydrochlorothiazide 25 mG Tablet 1 Tablet(s) Oral daily    MEDICATIONS  (PRN):  albuterol/ipratropium for Nebulization 3 milliLiter(s) Nebulizer every 6 hours PRN Shortness of Breath and/or Wheezing        LABS                                            11.8                  Neurophils% (auto):   55.5   (10-05 @ 01:55):    4.97 )-----------(285          Lymphocytes% (auto):  32.2                                          37.9                   Eosinphils% (auto):   2.6      Manual%: Neutrophils x    ; Lymphocytes x    ; Eosinophils x    ; Bands%: x    ; Blasts x                                    140    |  99     |  13                  Calcium: 9.0   / iCa: x      (10-05 @ 01:55)    ----------------------------<  133       Magnesium: 2.20                             4.3     |  28     |  0.83             Phosphorous: 3.6      TPro  6.6    /  Alb  3.9    /  TBili  0.4    /  DBili  x      /  AST  44     /  ALT  57     /  AlkPhos  117    05 Oct 2023 01:55    ( 10-05 @ 01:55 )   PT: 10.3 sec;   INR: 0.92 ratio  aPTT: 34.5 sec          ASSESSMENT & PLAN:   · Assessment	  58 year old right handed woman with a PMHx significant for COPD, Meniere's disease s/p R labyrinthectomy, bipolar disorder p/w vertigo, blurry vision, loss of balance when ambulating c/f CVA, admitted to MICU for close neuro monitoring. HDS and is pending transfer to neuro telemetry floor when bed available.    ======NEURO======  #CVA  - NIHSS 1 on admission (10/3); s/p tenecteplase in the ED (21:44 on 10/3)  - q1h neuro check;   - BP goal < 180/105;   - Telemetry  - CTH and CTA brain/neck (10/3) unrevealing  - Repeated CTH 2/2 concern for new headache; f/u CTH did not show any new intracranial hemorrhage.  - f/u Repeat CTHx2 on 10/4 negative for IC bleeding  - prediabetic A1c, Lipid profile w/ some elevations, Utox negative  - Limited TTE 2/2 poor quality, but f/u w/ bubble study  - c/w Atorvastatin 80mg qd & DAPT per neuro recs  - f/u Neurology recs  - f/u MRI brain w/o con when able    #Meniere's disease  - s/p R labyrinthectomy    #Headaches  - Given Dilaudid 0.5 with significant improvement over 24 hrs  - Repeated CTHx2 w/no new intracranial hemorrhage.     ======CV======  #No active issues  - BP goal < 180/105    ======PULM======  #COPD  - Not in acute exacerbation  - c/w home medications    ======RENAL======  #No active issues  - Trend sCr, WNL 0.83 most recently    ======GI======  #No active issues  - Keep NPO, pending swallow eval    ======ENDO======  #No active issues  - prediabetic 5.9 A1C%  - C/w Atorvastatin 80mg QD  - ISS    ======METABOLIC======  #No active issues    ======HEMATOLOGIC======  #DVT ppx  - s/p tenecteplase in the ED (21:44 on 10/3)  - SCDs & also DAPT now per neuro recs    ======ID======  #No active issues    ======Ethics/Prophylaxis======  Code: Full code  DVT: SCDs            For Follow-Up:  [ ] F/u MRI  [ ] F/u TTE w/ bubble study   [ ] F/u formal s/swallow

## 2023-10-05 NOTE — PROGRESS NOTE ADULT - SUBJECTIVE AND OBJECTIVE BOX
INTERVAL HPI/OVERNIGHT EVENTS:    SUBJECTIVE: Patient seen and examined at bedside.       VITAL SIGNS:  ICU Vital Signs Last 24 Hrs  T(C): 36.7 (05 Oct 2023 05:30), Max: 36.7 (05 Oct 2023 05:30)  T(F): 98 (05 Oct 2023 05:30), Max: 98 (05 Oct 2023 05:30)  HR: 77 (05 Oct 2023 05:30) (69 - 84)  BP: 116/71 (05 Oct 2023 05:30) (95/71 - 125/77)  BP(mean): 82 (05 Oct 2023 05:30) (74 - 103)  ABP: --  ABP(mean): --  RR: 15 (05 Oct 2023 05:30) (13 - 21)  SpO2: 93% (05 Oct 2023 05:30) (92% - 99%)    O2 Parameters below as of 05 Oct 2023 05:30  Patient On (Oxygen Delivery Method): room air            Plateau pressure:   P/F ratio:     10-04 @ 07:01  -  10-05 @ 07:00  --------------------------------------------------------  IN: 1500 mL / OUT: 1800 mL / NET: -300 mL      CAPILLARY BLOOD GLUCOSE  157 (03 Oct 2023 20:54)      POCT Blood Glucose.: 133 mg/dL (04 Oct 2023 11:30)    ECG:    PHYSICAL EXAM:  VITALS:   T(C): 36.7 (10-05-23 @ 05:30), Max: 36.7 (10-05-23 @ 05:30)  HR: 77 (10-05-23 @ 05:30) (69 - 84)  BP: 116/71 (10-05-23 @ 05:30) (95/71 - 125/77)  RR: 15 (10-05-23 @ 05:30) (13 - 21)  SpO2: 93% (10-05-23 @ 05:30) (92% - 99%)    CONSTITUTIONAL: No weakness, fevers or chills  HEENT: Yes dizziness, headache, blurry vision  RESPIRATORY: No cough, wheezing, hemoptysis; No shortness of breath  CARDIOVASCULAR: No chest pain, palpitations  GASTROINTESTINAL: No abdominal pain, nausea, vomiting, hematemesis, diarrhea, constipation, melena, hematochezia  GENITOURINARY: No dysuria, frequency, urgency, hematuria  NEUROLOGICAL: No numbness, weakness  SKIN: No itching,       MEDICATIONS:  MEDICATIONS  (STANDING):  amitriptyline 25 milliGRAM(s) Oral daily  ARIPiprazole 20 milliGRAM(s) Oral daily  aspirin  chewable 81 milliGRAM(s) Oral daily  atorvastatin 40 milliGRAM(s) Oral at bedtime  budesonide  80 MICROgram(s)/formoterol 4.5 MICROgram(s) Inhaler 2 Puff(s) Inhalation two times a day  chlorhexidine 2% Cloths 1 Application(s) Topical daily  cholecalciferol 1000 Unit(s) Oral daily  clopidogrel Tablet 75 milliGRAM(s) Oral daily  cyanocobalamin 1000 MICROGram(s) Oral daily  FLUoxetine 10 milliGRAM(s) Oral daily  furosemide    Tablet 20 milliGRAM(s) Oral daily  lamoTRIgine 150 milliGRAM(s) Oral daily  levothyroxine 25 MICROGram(s) Oral daily  methylphenidate 20 milliGRAM(s) Oral daily  multivitamin 1 Tablet(s) Oral daily  pantoprazole    Tablet 40 milliGRAM(s) Oral before breakfast  triamterene 37.5 mG/hydrochlorothiazide 25 mG Tablet 1 Tablet(s) Oral daily    MEDICATIONS  (PRN):  albuterol/ipratropium for Nebulization 3 milliLiter(s) Nebulizer every 6 hours PRN Shortness of Breath and/or Wheezing      ALLERGIES:  Allergies    No Known Allergies    Intolerances        LABS:                        11.8   4.97  )-----------( 285      ( 05 Oct 2023 01:55 )             37.9     10-05    140  |  99  |  13  ----------------------------<  133<H>  4.3   |  28  |  0.83    Ca    9.0      05 Oct 2023 01:55  Phos  3.6     10-05  Mg     2.20     10-05    TPro  6.6  /  Alb  3.9  /  TBili  0.4  /  DBili  x   /  AST  44<H>  /  ALT  57<H>  /  AlkPhos  117  10-05    PT/INR - ( 05 Oct 2023 01:55 )   PT: 10.3 sec;   INR: 0.92 ratio         PTT - ( 05 Oct 2023 01:55 )  PTT:34.5 sec  Urinalysis Basic - ( 05 Oct 2023 01:55 )    Color: x / Appearance: x / SG: x / pH: x  Gluc: 133 mg/dL / Ketone: x  / Bili: x / Urobili: x   Blood: x / Protein: x / Nitrite: x   Leuk Esterase: x / RBC: x / WBC x   Sq Epi: x / Non Sq Epi: x / Bacteria: x        RADIOLOGY & ADDITIONAL TESTS: Reviewed. INTERVAL HPI/OVERNIGHT EVENTS:    SUBJECTIVE: Patient seen and examined at bedside. Reports improved COLE/dizziness. Deneis n/v/d. Reports improved strength and denies any new symptoms.       VITAL SIGNS:  ICU Vital Signs Last 24 Hrs  T(C): 36.7 (05 Oct 2023 05:30), Max: 36.7 (05 Oct 2023 05:30)  T(F): 98 (05 Oct 2023 05:30), Max: 98 (05 Oct 2023 05:30)  HR: 77 (05 Oct 2023 05:30) (69 - 84)  BP: 116/71 (05 Oct 2023 05:30) (95/71 - 125/77)  BP(mean): 82 (05 Oct 2023 05:30) (74 - 103)  ABP: --  ABP(mean): --  RR: 15 (05 Oct 2023 05:30) (13 - 21)  SpO2: 93% (05 Oct 2023 05:30) (92% - 99%)    O2 Parameters below as of 05 Oct 2023 05:30  Patient On (Oxygen Delivery Method): room air            Plateau pressure:   P/F ratio:     10-04 @ 07:01  -  10-05 @ 07:00  --------------------------------------------------------  IN: 1500 mL / OUT: 1800 mL / NET: -300 mL      CAPILLARY BLOOD GLUCOSE  157 (03 Oct 2023 20:54)      POCT Blood Glucose.: 133 mg/dL (04 Oct 2023 11:30)    ECG:    PHYSICAL EXAM:  VITALS:   T(C): 36.7 (10-05-23 @ 05:30), Max: 36.7 (10-05-23 @ 05:30)  HR: 77 (10-05-23 @ 05:30) (69 - 84)  BP: 116/71 (10-05-23 @ 05:30) (95/71 - 125/77)  RR: 15 (10-05-23 @ 05:30) (13 - 21)  SpO2: 93% (10-05-23 @ 05:30) (92% - 99%)    Constitutional: Female appearing stated age, nontoxic, not in distress,    Head: Normocephalic; Atraumatic  Eyes: clear sclera; non-icteric  Extremities: No cyanosis; appearing flushed diffusely but reports this is baseline.   Resp: breathing comfortably on NC  Neck: no nuchal rigidity      MEDICATIONS:  MEDICATIONS  (STANDING):  amitriptyline 25 milliGRAM(s) Oral daily  ARIPiprazole 20 milliGRAM(s) Oral daily  aspirin  chewable 81 milliGRAM(s) Oral daily  atorvastatin 40 milliGRAM(s) Oral at bedtime  budesonide  80 MICROgram(s)/formoterol 4.5 MICROgram(s) Inhaler 2 Puff(s) Inhalation two times a day  chlorhexidine 2% Cloths 1 Application(s) Topical daily  cholecalciferol 1000 Unit(s) Oral daily  clopidogrel Tablet 75 milliGRAM(s) Oral daily  cyanocobalamin 1000 MICROGram(s) Oral daily  FLUoxetine 10 milliGRAM(s) Oral daily  furosemide    Tablet 20 milliGRAM(s) Oral daily  lamoTRIgine 150 milliGRAM(s) Oral daily  levothyroxine 25 MICROGram(s) Oral daily  methylphenidate 20 milliGRAM(s) Oral daily  multivitamin 1 Tablet(s) Oral daily  pantoprazole    Tablet 40 milliGRAM(s) Oral before breakfast  triamterene 37.5 mG/hydrochlorothiazide 25 mG Tablet 1 Tablet(s) Oral daily    MEDICATIONS  (PRN):  albuterol/ipratropium for Nebulization 3 milliLiter(s) Nebulizer every 6 hours PRN Shortness of Breath and/or Wheezing      ALLERGIES:  Allergies    No Known Allergies    Intolerances        LABS:                        11.8   4.97  )-----------( 285      ( 05 Oct 2023 01:55 )             37.9     10-05    140  |  99  |  13  ----------------------------<  133<H>  4.3   |  28  |  0.83    Ca    9.0      05 Oct 2023 01:55  Phos  3.6     10-05  Mg     2.20     10-05    TPro  6.6  /  Alb  3.9  /  TBili  0.4  /  DBili  x   /  AST  44<H>  /  ALT  57<H>  /  AlkPhos  117  10-05    PT/INR - ( 05 Oct 2023 01:55 )   PT: 10.3 sec;   INR: 0.92 ratio         PTT - ( 05 Oct 2023 01:55 )  PTT:34.5 sec  Urinalysis Basic - ( 05 Oct 2023 01:55 )    Color: x / Appearance: x / SG: x / pH: x  Gluc: 133 mg/dL / Ketone: x  / Bili: x / Urobili: x   Blood: x / Protein: x / Nitrite: x   Leuk Esterase: x / RBC: x / WBC x   Sq Epi: x / Non Sq Epi: x / Bacteria: x        RADIOLOGY & ADDITIONAL TESTS: Reviewed.

## 2023-10-05 NOTE — PROGRESS NOTE ADULT - ATTENDING COMMENTS
Patient is a 59 yo F w/ COPD, Meniere's disease s/p R labyrinthectomy, bipolar disorder p/w vertigo, blurry vision, loss of balance when ambulating concern for acute CVA, admitted to MICU for frequent neuro checks after receiving TNK 10/3 944PM    #CVA - CTH/perfusion on presentation negative. s/p TNK 10/3 944PM. Endorses improving RUE and RLE weakness. Still with sensory deficits in RLE. Complaint of HA improved  #Headache  #Hypoxemic respiratory failure - Desaturation during sleep, possible undiagnosed HERNAN. May benefit from outpatient sleep study  - 24 hour CTH no acute findings  - PT/OT eval completed, anticipate rehab  - Speech and swallow eval, rec regular diet and thin liquids  - TTE w/ bubble study performed  - Awaiting MRI head  - c/w statin  - ASA and Plavix started as per neuro   - Will start chemical DVT ppx  - Monitor on Tele    Zak Farris MD  Pulmonary & Critical Care

## 2023-10-05 NOTE — PROGRESS NOTE ADULT - SUBJECTIVE AND OBJECTIVE BOX
Neurology Progress Note    SUBJECTIVE/OBJECTIVE/INTERVAL EVENTS: Patient seen and examined at bedside w/ neuro attending and team. Patient states her symptoms are improving.    INTERVAL HISTORY: MR head pending    REVIEW OF SYSTEMS: Otherwise denies fever, chills, headaches, vision changes, blurry vision, double vision, nausea, vomiting, hearing change, focal weakness, focal numbness, parasthesias, bowel/ bladder incontinence.  Few questions of a 10-system ROS was performed and is negative except for those items noted above and/or in the HPI.    VITALS & EXAMINATION:  Vital Signs Last 24 Hrs  T(C): 33.3 (05 Oct 2023 07:49), Max: 36.7 (05 Oct 2023 05:30)  T(F): 92 (05 Oct 2023 07:49), Max: 98 (05 Oct 2023 05:30)  HR: 84 (05 Oct 2023 12:00) (69 - 84)  BP: 116/75 (05 Oct 2023 12:00) (105/63 - 125/77)  BP(mean): 83 (05 Oct 2023 12:00) (71 - 103)  RR: 22 (05 Oct 2023 12:00) (13 - 22)  SpO2: 97% (05 Oct 2023 12:00) (93% - 99%)    Parameters below as of 05 Oct 2023 12:00  Patient On (Oxygen Delivery Method): room air        General:  Constitutional: Female, appears stated age, nontoxic, not in distress,    Head: Normocephalic;   Eyes: clear sclera;   Extremities: No cyanosis;   Resp: breathing comfortably  Neck: no nuchal rigidity  Fundoscopic exam:      Neurological (>12):  MS: Awake, alert.  Oriented person place situation. Follows commands. Attends to examiner  Language: Speech is clear, fluent, good repetition,  comprehension, registration of words.  CNs: PERRL (R 3mm, L 3mm). VFF. EOMI. No disconjugate gaze, nystagmus. V1-3 intact LT, No facial asymmetry b/l. Hearing grossly normal b/l. Tongue midline and can move side to side.     Motor - Normal bulk and tone throughout. No pronator drift.    L/R (out of 5 each)       Deltoid  5/5    Biceps   5/4+      Triceps  5/4+         Wrist Extension 5/5   Wrist Flexion  5/5   Interossei 5/5     5/4+   L/R (out of 5 each)       Hip Flexion  5/4+    Hip Extension  5/4+  Knee Extension  5/4+  Dorsiflexion  5/4+      Plantar Flexion 5/4+     Sensation: Intact to LT b/l. Cortical: Extinction on DSS (neglect): none  Reflexes L/R:  Biceps(C5) 2/2  BR(C6) 2/2   Triceps(C7)  2/2 Patellar(L4)   2/2   Ankles 2/2   Toes: mute b/l  Coordination: No dysmetria to FTN b/l UE      LABORATORY:  CBC                       11.8   4.97  )-----------( 285      ( 05 Oct 2023 01:55 )             37.9     Chem 10-05    140  |  99  |  13  ----------------------------<  133<H>  4.3   |  28  |  0.83    Ca    9.0      05 Oct 2023 01:55  Phos  3.6     10-05  Mg     2.20     10-05    TPro  6.6  /  Alb  3.9  /  TBili  0.4  /  DBili  x   /  AST  44<H>  /  ALT  57<H>  /  AlkPhos  117  10-05    LFTs LIVER FUNCTIONS - ( 05 Oct 2023 01:55 )  Alb: 3.9 g/dL / Pro: 6.6 g/dL / ALK PHOS: 117 U/L / ALT: 57 U/L / AST: 44 U/L / GGT: x           Coagulopathy PT/INR - ( 05 Oct 2023 01:55 )   PT: 10.3 sec;   INR: 0.92 ratio         PTT - ( 05 Oct 2023 01:55 )  PTT:34.5 sec  Lipid Panel 10-05 Chol 205<H> LDL -- HDL 33<L> Trig 198<H>, 10-03 Chol 224<H> LDL -- HDL 36<L> Trig 246<H>  A1c   Cardiac enzymes     U/A Urinalysis Basic - ( 05 Oct 2023 01:55 )    Color: x / Appearance: x / SG: x / pH: x  Gluc: 133 mg/dL / Ketone: x  / Bili: x / Urobili: x   Blood: x / Protein: x / Nitrite: x   Leuk Esterase: x / RBC: x / WBC x   Sq Epi: x / Non Sq Epi: x / Bacteria: x      CSF  Immunological  Other    STUDIES & IMAGING: (EEG, CT, MR, U/S, TTE/BETTY):  MR brain pending

## 2023-10-06 LAB
ALBUMIN SERPL ELPH-MCNC: 3.7 G/DL — SIGNIFICANT CHANGE UP (ref 3.3–5)
ALP SERPL-CCNC: 107 U/L — SIGNIFICANT CHANGE UP (ref 40–120)
ALT FLD-CCNC: 44 U/L — HIGH (ref 4–33)
ANION GAP SERPL CALC-SCNC: 13 MMOL/L — SIGNIFICANT CHANGE UP (ref 7–14)
APTT BLD: 34.9 SEC — SIGNIFICANT CHANGE UP (ref 24.5–35.6)
AST SERPL-CCNC: 31 U/L — SIGNIFICANT CHANGE UP (ref 4–32)
BASE EXCESS BLDV CALC-SCNC: 5.3 MMOL/L — HIGH (ref -2–3)
BASOPHILS # BLD AUTO: 0.02 K/UL — SIGNIFICANT CHANGE UP (ref 0–0.2)
BASOPHILS NFR BLD AUTO: 0.3 % — SIGNIFICANT CHANGE UP (ref 0–2)
BILIRUB SERPL-MCNC: 0.4 MG/DL — SIGNIFICANT CHANGE UP (ref 0.2–1.2)
BUN SERPL-MCNC: 13 MG/DL — SIGNIFICANT CHANGE UP (ref 7–23)
CALCIUM SERPL-MCNC: 9.2 MG/DL — SIGNIFICANT CHANGE UP (ref 8.4–10.5)
CHLORIDE SERPL-SCNC: 99 MMOL/L — SIGNIFICANT CHANGE UP (ref 98–107)
CO2 BLDV-SCNC: 32.6 MMOL/L — HIGH (ref 22–26)
CO2 SERPL-SCNC: 24 MMOL/L — SIGNIFICANT CHANGE UP (ref 22–31)
CREAT SERPL-MCNC: 0.76 MG/DL — SIGNIFICANT CHANGE UP (ref 0.5–1.3)
EGFR: 91 ML/MIN/1.73M2 — SIGNIFICANT CHANGE UP
EOSINOPHIL # BLD AUTO: 0.13 K/UL — SIGNIFICANT CHANGE UP (ref 0–0.5)
EOSINOPHIL NFR BLD AUTO: 2.2 % — SIGNIFICANT CHANGE UP (ref 0–6)
GLUCOSE SERPL-MCNC: 130 MG/DL — HIGH (ref 70–99)
HCO3 BLDV-SCNC: 31 MMOL/L — HIGH (ref 22–29)
HCT VFR BLD CALC: 36.9 % — SIGNIFICANT CHANGE UP (ref 34.5–45)
HGB BLD-MCNC: 11.6 G/DL — SIGNIFICANT CHANGE UP (ref 11.5–15.5)
IANC: 3.12 K/UL — SIGNIFICANT CHANGE UP (ref 1.8–7.4)
IMM GRANULOCYTES NFR BLD AUTO: 0.9 % — SIGNIFICANT CHANGE UP (ref 0–0.9)
INR BLD: 0.94 RATIO — SIGNIFICANT CHANGE UP (ref 0.85–1.18)
LYMPHOCYTES # BLD AUTO: 2.1 K/UL — SIGNIFICANT CHANGE UP (ref 1–3.3)
LYMPHOCYTES # BLD AUTO: 36 % — SIGNIFICANT CHANGE UP (ref 13–44)
MAGNESIUM SERPL-MCNC: 2 MG/DL — SIGNIFICANT CHANGE UP (ref 1.6–2.6)
MCHC RBC-ENTMCNC: 26.5 PG — LOW (ref 27–34)
MCHC RBC-ENTMCNC: 31.4 GM/DL — LOW (ref 32–36)
MCV RBC AUTO: 84.2 FL — SIGNIFICANT CHANGE UP (ref 80–100)
MONOCYTES # BLD AUTO: 0.41 K/UL — SIGNIFICANT CHANGE UP (ref 0–0.9)
MONOCYTES NFR BLD AUTO: 7 % — SIGNIFICANT CHANGE UP (ref 2–14)
NEUTROPHILS # BLD AUTO: 3.12 K/UL — SIGNIFICANT CHANGE UP (ref 1.8–7.4)
NEUTROPHILS NFR BLD AUTO: 53.6 % — SIGNIFICANT CHANGE UP (ref 43–77)
NRBC # BLD: 0 /100 WBCS — SIGNIFICANT CHANGE UP (ref 0–0)
NRBC # FLD: 0 K/UL — SIGNIFICANT CHANGE UP (ref 0–0)
PCO2 BLDV: 49 MMHG — SIGNIFICANT CHANGE UP (ref 39–52)
PH BLDV: 7.41 — SIGNIFICANT CHANGE UP (ref 7.32–7.43)
PHOSPHATE SERPL-MCNC: 2.8 MG/DL — SIGNIFICANT CHANGE UP (ref 2.5–4.5)
PLATELET # BLD AUTO: 261 K/UL — SIGNIFICANT CHANGE UP (ref 150–400)
PO2 BLDV: 78 MMHG — HIGH (ref 25–45)
POTASSIUM SERPL-MCNC: 3.4 MMOL/L — LOW (ref 3.5–5.3)
POTASSIUM SERPL-SCNC: 3.4 MMOL/L — LOW (ref 3.5–5.3)
PROT SERPL-MCNC: 6.5 G/DL — SIGNIFICANT CHANGE UP (ref 6–8.3)
PROTHROM AB SERPL-ACNC: 10.6 SEC — SIGNIFICANT CHANGE UP (ref 9.5–13)
RBC # BLD: 4.38 M/UL — SIGNIFICANT CHANGE UP (ref 3.8–5.2)
RBC # FLD: 17.3 % — HIGH (ref 10.3–14.5)
SAO2 % BLDV: 96.1 % — HIGH (ref 67–88)
SODIUM SERPL-SCNC: 136 MMOL/L — SIGNIFICANT CHANGE UP (ref 135–145)
WBC # BLD: 5.83 K/UL — SIGNIFICANT CHANGE UP (ref 3.8–10.5)
WBC # FLD AUTO: 5.83 K/UL — SIGNIFICANT CHANGE UP (ref 3.8–10.5)

## 2023-10-06 PROCEDURE — 70450 CT HEAD/BRAIN W/O DYE: CPT | Mod: 26

## 2023-10-06 PROCEDURE — 99233 SBSQ HOSP IP/OBS HIGH 50: CPT | Mod: GC

## 2023-10-06 RX ORDER — OXYCODONE HYDROCHLORIDE 5 MG/1
2.5 TABLET ORAL ONCE
Refills: 0 | Status: DISCONTINUED | OUTPATIENT
Start: 2023-10-06 | End: 2023-10-06

## 2023-10-06 RX ORDER — ACETAMINOPHEN 500 MG
1000 TABLET ORAL ONCE
Refills: 0 | Status: COMPLETED | OUTPATIENT
Start: 2023-10-06 | End: 2023-10-06

## 2023-10-06 RX ORDER — POTASSIUM CHLORIDE 20 MEQ
40 PACKET (EA) ORAL ONCE
Refills: 0 | Status: COMPLETED | OUTPATIENT
Start: 2023-10-06 | End: 2023-10-06

## 2023-10-06 RX ORDER — INFLUENZA VIRUS VACCINE 15; 15; 15; 15 UG/.5ML; UG/.5ML; UG/.5ML; UG/.5ML
0.5 SUSPENSION INTRAMUSCULAR ONCE
Refills: 0 | Status: ACTIVE | OUTPATIENT
Start: 2023-10-06 | End: 2024-09-03

## 2023-10-06 RX ORDER — POTASSIUM CHLORIDE 20 MEQ
10 PACKET (EA) ORAL
Refills: 0 | Status: DISCONTINUED | OUTPATIENT
Start: 2023-10-06 | End: 2023-10-06

## 2023-10-06 RX ORDER — METOCLOPRAMIDE HCL 10 MG
10 TABLET ORAL ONCE
Refills: 0 | Status: COMPLETED | OUTPATIENT
Start: 2023-10-06 | End: 2023-10-06

## 2023-10-06 RX ADMIN — Medication 25 MICROGRAM(S): at 05:47

## 2023-10-06 RX ADMIN — Medication 1 TABLET(S): at 12:00

## 2023-10-06 RX ADMIN — CHLORHEXIDINE GLUCONATE 1 APPLICATION(S): 213 SOLUTION TOPICAL at 12:00

## 2023-10-06 RX ADMIN — Medication 10 MILLIGRAM(S): at 12:00

## 2023-10-06 RX ADMIN — Medication 25 MILLIGRAM(S): at 11:59

## 2023-10-06 RX ADMIN — Medication 1000 UNIT(S): at 12:00

## 2023-10-06 RX ADMIN — Medication 100 MILLIEQUIVALENT(S): at 07:47

## 2023-10-06 RX ADMIN — BUDESONIDE AND FORMOTEROL FUMARATE DIHYDRATE 2 PUFF(S): 160; 4.5 AEROSOL RESPIRATORY (INHALATION) at 09:38

## 2023-10-06 RX ADMIN — OXYCODONE HYDROCHLORIDE 2.5 MILLIGRAM(S): 5 TABLET ORAL at 20:42

## 2023-10-06 RX ADMIN — OXYCODONE HYDROCHLORIDE 2.5 MILLIGRAM(S): 5 TABLET ORAL at 21:12

## 2023-10-06 RX ADMIN — PANTOPRAZOLE SODIUM 40 MILLIGRAM(S): 20 TABLET, DELAYED RELEASE ORAL at 05:46

## 2023-10-06 RX ADMIN — Medication 400 MILLIGRAM(S): at 19:00

## 2023-10-06 RX ADMIN — Medication 10 MILLIGRAM(S): at 21:58

## 2023-10-06 RX ADMIN — BUDESONIDE AND FORMOTEROL FUMARATE DIHYDRATE 2 PUFF(S): 160; 4.5 AEROSOL RESPIRATORY (INHALATION) at 20:03

## 2023-10-06 RX ADMIN — CLOPIDOGREL BISULFATE 75 MILLIGRAM(S): 75 TABLET, FILM COATED ORAL at 12:00

## 2023-10-06 RX ADMIN — Medication 1 TABLET(S): at 05:47

## 2023-10-06 RX ADMIN — Medication 20 MILLIGRAM(S): at 11:59

## 2023-10-06 RX ADMIN — Medication 81 MILLIGRAM(S): at 21:58

## 2023-10-06 RX ADMIN — ARIPIPRAZOLE 20 MILLIGRAM(S): 15 TABLET ORAL at 11:59

## 2023-10-06 RX ADMIN — Medication 40 MILLIEQUIVALENT(S): at 09:13

## 2023-10-06 RX ADMIN — Medication 20 MILLIGRAM(S): at 05:47

## 2023-10-06 RX ADMIN — ATORVASTATIN CALCIUM 40 MILLIGRAM(S): 80 TABLET, FILM COATED ORAL at 21:58

## 2023-10-06 RX ADMIN — Medication 1000 MILLIGRAM(S): at 19:30

## 2023-10-06 RX ADMIN — LAMOTRIGINE 150 MILLIGRAM(S): 25 TABLET, ORALLY DISINTEGRATING ORAL at 12:00

## 2023-10-06 RX ADMIN — PREGABALIN 1000 MICROGRAM(S): 225 CAPSULE ORAL at 12:00

## 2023-10-06 NOTE — PROGRESS NOTE ADULT - ASSESSMENT
58 year old right handed woman with a PMHx significant for COPD, Meniere's disease s/p R labyrinthectomy, bipolar disorder p/w vertigo, blurry vision, loss of balance when ambulating c/f CVA, admitted to MICU for close neuro monitoring. HDS and is pending transfer to neuro telemetry floor when bed available.    ======NEURO======  #CVA  - NIHSS 1 on admission (10/3); s/p tenecteplase in the ED (21:44 on 10/3)  - q1h neuro check;   - BP goal < 180/105;   - Telemetry  - CTH and CTA brain/neck (10/3) unrevealing  - Repeated CTH 2/2 concern for new headache; f/u CTH did not show any new intracranial hemorrhage.  - f/u Repeat CTHx2 on 10/4 negative for IC bleeding  - prediabetic A1c, Lipid profile w/ some elevations, Utox negative  - Limited TTE 2/2 poor quality, but f/u w/ bubble study  - c/w Atorvastatin 80mg qd & DAPT per neuro recs  - f/u Neurology recs  - f/u MRI brain w/o con when able    #Meniere's disease  - s/p R labyrinthectomy    #Headaches  - Given Dilaudid 0.5 with significant improvement over 24 hrs  - Repeated CTHx2 w/no new intracranial hemorrhage.     ======CV======  #No active issues  - BP goal < 180/105    ======PULM======  #COPD  - Not in acute exacerbation  - c/w home medications    ======RENAL======  #No active issues  - Trend sCr, WNL 0.83 most recently    ======GI======  #No active issues  - Keep NPO, pending swallow eval    ======ENDO======  #No active issues  - prediabetic 5.9 A1C%  - C/w Atorvastatin 80mg QD  - ISS    ======METABOLIC======  #No active issues    ======HEMATOLOGIC======  #DVT ppx  - s/p tenecteplase in the ED (21:44 on 10/3)  - SCDs & also DAPT now per neuro recs    ======ID======  #No active issues    ======Ethics/Prophylaxis======  Code: Full code  DVT: SCDs       58 year old right handed woman with a PMHx significant for COPD, Meniere's disease s/p R labyrinthectomy, bipolar disorder p/w vertigo, blurry vision, loss of balance when ambulating c/f CVA, admitted to MICU for close neuro monitoring. HDS and is pending transfer to neuro telemetry floor when bed available.     ======NEURO======  #CVA  - NIHSS 1 on admission (10/3); s/p tenecteplase in the ED (21:44 on 10/3)  - q1h neuro check;   - BP goal < 180/105;   - Telemetry  - CTH and CTA brain/neck (10/3) unrevealing  - Repeated CTH 2/2 concern for new headache; f/u CTH did not show any new intracranial hemorrhage.  - f/u Repeat CTHx2 on 10/4 negative for IC bleeding  - prediabetic A1c, Lipid profile w/ some elevations, Utox negative  - Limited TTE 2/2 poor quality, but f/u w/ bubble study  - c/w Atorvastatin 80mg qd & DAPT per neuro recs  - f/u Neurology recs  - f/u MRI brain w/o con when able    #Meniere's disease  - s/p R labyrinthectomy    #Headaches  - Resolving since admission  - Dilaudid 0.5 with significant improvement over 24 hrs  - Repeated CTHx2 w/no new intracranial hemorrhage appreciated     ======CV======  #No active issues  - BP goal < 180/105    ======PULM======  #COPD  - Not in acute exacerbation  - c/w home medications    ======RENAL======  #No active issues  - Trend sCr, WNL     ======GI======  #No active issues  - Reg diet    ======ENDO======  #No active issues  - prediabetic 5.9 A1C%  - C/w Atorvastatin 80mg QD  - ISS    ======METABOLIC======  #No active issues    ======HEMATOLOGIC======  #DVT ppx  - s/p tenecteplase in the ED (21:44 on 10/3)  - SCDs & also DAPT now per neuro recs    ======ID======  #No active issues    ======Ethics/Prophylaxis======  Code: Full code  DVT: SCDs

## 2023-10-06 NOTE — PROGRESS NOTE ADULT - SUBJECTIVE AND OBJECTIVE BOX
INTERVAL HPI/OVERNIGHT EVENTS:    SUBJECTIVE: Patient seen and examined at bedside.       VITAL SIGNS:  ICU Vital Signs Last 24 Hrs  T(C): 36.7 (06 Oct 2023 04:00), Max: 36.7 (05 Oct 2023 16:00)  T(F): 98.1 (06 Oct 2023 04:00), Max: 98.1 (05 Oct 2023 16:00)  HR: 75 (06 Oct 2023 04:00) (69 - 89)  BP: 115/52 (06 Oct 2023 04:00) (95/51 - 124/67)  BP(mean): 65 (06 Oct 2023 04:00) (61 - 83)  ABP: --  ABP(mean): --  RR: 20 (06 Oct 2023 04:00) (16 - 33)  SpO2: 95% (06 Oct 2023 04:00) (93% - 97%)    O2 Parameters below as of 06 Oct 2023 04:00  Patient On (Oxygen Delivery Method): room air            Plateau pressure:   P/F ratio:     10-05 @ 07:01  -  10-06 @ 07:00  --------------------------------------------------------  IN: 590 mL / OUT: 900 mL / NET: -310 mL      CAPILLARY BLOOD GLUCOSE      POCT Blood Glucose.: 133 mg/dL (04 Oct 2023 11:30)    ECG:    PHYSICAL EXAM:  VITALS:   T(C): 36.7 (10-06-23 @ 04:00), Max: 36.7 (10-05-23 @ 16:00)  HR: 75 (10-06-23 @ 04:00) (69 - 89)  BP: 115/52 (10-06-23 @ 04:00) (95/51 - 124/67)  RR: 20 (10-06-23 @ 04:00) (16 - 33)  SpO2: 95% (10-06-23 @ 04:00) (93% - 97%)    Constitutional: Female appearing stated age, nontoxic, not in distress,    Head: Normocephalic; Atraumatic  Eyes: clear sclera; non-icteric  Extremities: No cyanosis; appearing flushed diffusely but reports this is baseline.   Resp: breathing comfortably on RA  Neck: no nuchal rigidity      MEDICATIONS:  MEDICATIONS  (STANDING):  amitriptyline 25 milliGRAM(s) Oral daily  ARIPiprazole 20 milliGRAM(s) Oral daily  aspirin  chewable 81 milliGRAM(s) Oral daily  atorvastatin 40 milliGRAM(s) Oral at bedtime  budesonide  80 MICROgram(s)/formoterol 4.5 MICROgram(s) Inhaler 2 Puff(s) Inhalation two times a day  chlorhexidine 2% Cloths 1 Application(s) Topical daily  cholecalciferol 1000 Unit(s) Oral daily  clopidogrel Tablet 75 milliGRAM(s) Oral daily  cyanocobalamin 1000 MICROGram(s) Oral daily  FLUoxetine 10 milliGRAM(s) Oral daily  furosemide    Tablet 20 milliGRAM(s) Oral daily  influenza   Vaccine 0.5 milliLiter(s) IntraMuscular once  lamoTRIgine 150 milliGRAM(s) Oral daily  levothyroxine 25 MICROGram(s) Oral daily  methylphenidate 20 milliGRAM(s) Oral daily  multivitamin 1 Tablet(s) Oral daily  pantoprazole    Tablet 40 milliGRAM(s) Oral before breakfast  potassium chloride  10 mEq/100 mL IVPB 10 milliEquivalent(s) IV Intermittent every 1 hour  triamterene 37.5 mG/hydrochlorothiazide 25 mG Tablet 1 Tablet(s) Oral daily    MEDICATIONS  (PRN):  albuterol/ipratropium for Nebulization 3 milliLiter(s) Nebulizer every 6 hours PRN Shortness of Breath and/or Wheezing      ALLERGIES:  Allergies    No Known Allergies    Intolerances        LABS:                        11.6   5.83  )-----------( 261      ( 06 Oct 2023 02:54 )             36.9     10-06    136  |  99  |  13  ----------------------------<  130<H>  3.4<L>   |  24  |  0.76    Ca    9.2      06 Oct 2023 02:54  Phos  2.8     10-06  Mg     2.00     10-06    TPro  6.5  /  Alb  3.7  /  TBili  0.4  /  DBili  x   /  AST  31  /  ALT  44<H>  /  AlkPhos  107  10-06    PT/INR - ( 06 Oct 2023 02:54 )   PT: 10.6 sec;   INR: 0.94 ratio         PTT - ( 06 Oct 2023 02:54 )  PTT:34.9 sec  Urinalysis Basic - ( 06 Oct 2023 02:54 )    Color: x / Appearance: x / SG: x / pH: x  Gluc: 130 mg/dL / Ketone: x  / Bili: x / Urobili: x   Blood: x / Protein: x / Nitrite: x   Leuk Esterase: x / RBC: x / WBC x   Sq Epi: x / Non Sq Epi: x / Bacteria: x        RADIOLOGY & ADDITIONAL TESTS: Reviewed. INTERVAL HPI/OVERNIGHT EVENTS:    SUBJECTIVE: Patient seen and examined at bedside. Reports improved headache and minimal dizziness. Denies SOB, CP, or other symptoms at this time.       VITAL SIGNS:  ICU Vital Signs Last 24 Hrs  T(C): 36.7 (06 Oct 2023 04:00), Max: 36.7 (05 Oct 2023 16:00)  T(F): 98.1 (06 Oct 2023 04:00), Max: 98.1 (05 Oct 2023 16:00)  HR: 75 (06 Oct 2023 04:00) (69 - 89)  BP: 115/52 (06 Oct 2023 04:00) (95/51 - 124/67)  BP(mean): 65 (06 Oct 2023 04:00) (61 - 83)  ABP: --  ABP(mean): --  RR: 20 (06 Oct 2023 04:00) (16 - 33)  SpO2: 95% (06 Oct 2023 04:00) (93% - 97%)    O2 Parameters below as of 06 Oct 2023 04:00  Patient On (Oxygen Delivery Method): room air            Plateau pressure:   P/F ratio:     10-05 @ 07:01  -  10-06 @ 07:00  --------------------------------------------------------  IN: 590 mL / OUT: 900 mL / NET: -310 mL      CAPILLARY BLOOD GLUCOSE      POCT Blood Glucose.: 133 mg/dL (04 Oct 2023 11:30)    ECG:    PHYSICAL EXAM:  VITALS:   T(C): 36.7 (10-06-23 @ 04:00), Max: 36.7 (10-05-23 @ 16:00)  HR: 75 (10-06-23 @ 04:00) (69 - 89)  BP: 115/52 (10-06-23 @ 04:00) (95/51 - 124/67)  RR: 20 (10-06-23 @ 04:00) (16 - 33)  SpO2: 95% (10-06-23 @ 04:00) (93% - 97%)    Constitutional: Female appearing stated age, nontoxic, not in distress, resting comfortably in chair  Head: Normocephalic; Atraumatic  Eyes: clear sclera; non-icteric  Extremities: No cyanosis; Significantly reduced flushing of face/extremities compared with prior presentations.   Resp: Breathing comfortably on RA  Neck: No nuchal rigidity      MEDICATIONS:  MEDICATIONS  (STANDING):  amitriptyline 25 milliGRAM(s) Oral daily  ARIPiprazole 20 milliGRAM(s) Oral daily  aspirin  chewable 81 milliGRAM(s) Oral daily  atorvastatin 40 milliGRAM(s) Oral at bedtime  budesonide  80 MICROgram(s)/formoterol 4.5 MICROgram(s) Inhaler 2 Puff(s) Inhalation two times a day  chlorhexidine 2% Cloths 1 Application(s) Topical daily  cholecalciferol 1000 Unit(s) Oral daily  clopidogrel Tablet 75 milliGRAM(s) Oral daily  cyanocobalamin 1000 MICROGram(s) Oral daily  FLUoxetine 10 milliGRAM(s) Oral daily  furosemide    Tablet 20 milliGRAM(s) Oral daily  influenza   Vaccine 0.5 milliLiter(s) IntraMuscular once  lamoTRIgine 150 milliGRAM(s) Oral daily  levothyroxine 25 MICROGram(s) Oral daily  methylphenidate 20 milliGRAM(s) Oral daily  multivitamin 1 Tablet(s) Oral daily  pantoprazole    Tablet 40 milliGRAM(s) Oral before breakfast  potassium chloride  10 mEq/100 mL IVPB 10 milliEquivalent(s) IV Intermittent every 1 hour  triamterene 37.5 mG/hydrochlorothiazide 25 mG Tablet 1 Tablet(s) Oral daily    MEDICATIONS  (PRN):  albuterol/ipratropium for Nebulization 3 milliLiter(s) Nebulizer every 6 hours PRN Shortness of Breath and/or Wheezing      ALLERGIES:  Allergies    No Known Allergies    Intolerances        LABS:                        11.6   5.83  )-----------( 261      ( 06 Oct 2023 02:54 )             36.9     10-06    136  |  99  |  13  ----------------------------<  130<H>  3.4<L>   |  24  |  0.76    Ca    9.2      06 Oct 2023 02:54  Phos  2.8     10-06  Mg     2.00     10-06    TPro  6.5  /  Alb  3.7  /  TBili  0.4  /  DBili  x   /  AST  31  /  ALT  44<H>  /  AlkPhos  107  10-06    PT/INR - ( 06 Oct 2023 02:54 )   PT: 10.6 sec;   INR: 0.94 ratio         PTT - ( 06 Oct 2023 02:54 )  PTT:34.9 sec  Urinalysis Basic - ( 06 Oct 2023 02:54 )    Color: x / Appearance: x / SG: x / pH: x  Gluc: 130 mg/dL / Ketone: x  / Bili: x / Urobili: x   Blood: x / Protein: x / Nitrite: x   Leuk Esterase: x / RBC: x / WBC x   Sq Epi: x / Non Sq Epi: x / Bacteria: x        RADIOLOGY & ADDITIONAL TESTS: Reviewed.

## 2023-10-06 NOTE — PROGRESS NOTE ADULT - ATTENDING COMMENTS
Patient is a 59 yo F w/ COPD, Meniere's disease s/p R labyrinthectomy, bipolar disorder p/w vertigo, blurry vision, loss of balance when ambulating concern for acute CVA, admitted to MICU for frequent neuro checks after receiving TNK 10/3 944PM    #CVA - CTH/perfusion on presentation negative. s/p TNK 10/3 944PM. Endorses improving RUE and RLE weakness. Still with sensory deficits in RLE. Complaint of HA improved  #Headache  #Hypoxemic respiratory failure - Desaturation during sleep, possible undiagnosed HERNAN. May benefit from outpatient sleep study  - 24 hour CTH no acute findings  - PT/OT eval completed, anticipate home with PT  - Speech and swallow eval, rec regular diet and thin liquids  - TTE w/ bubble study still need to be performed  - Awaiting MRI head  - c/w statin  - ASA and Plavix started as per neuro   - c/w DVT ppx  - Monitor on Tele  - Will discuss with neuro if remaining workup can be done as outpatient    Zak Farris MD  Pulmonary & Critical Care

## 2023-10-07 LAB
ALBUMIN SERPL ELPH-MCNC: 4 G/DL — SIGNIFICANT CHANGE UP (ref 3.3–5)
ALP SERPL-CCNC: 115 U/L — SIGNIFICANT CHANGE UP (ref 40–120)
ALT FLD-CCNC: 47 U/L — HIGH (ref 4–33)
ANION GAP SERPL CALC-SCNC: 15 MMOL/L — HIGH (ref 7–14)
AST SERPL-CCNC: 40 U/L — HIGH (ref 4–32)
BASOPHILS # BLD AUTO: 0.03 K/UL — SIGNIFICANT CHANGE UP (ref 0–0.2)
BASOPHILS NFR BLD AUTO: 0.5 % — SIGNIFICANT CHANGE UP (ref 0–2)
BILIRUB SERPL-MCNC: 0.5 MG/DL — SIGNIFICANT CHANGE UP (ref 0.2–1.2)
BUN SERPL-MCNC: 8 MG/DL — SIGNIFICANT CHANGE UP (ref 7–23)
CALCIUM SERPL-MCNC: 9 MG/DL — SIGNIFICANT CHANGE UP (ref 8.4–10.5)
CHLORIDE SERPL-SCNC: 96 MMOL/L — LOW (ref 98–107)
CO2 SERPL-SCNC: 24 MMOL/L — SIGNIFICANT CHANGE UP (ref 22–31)
CREAT SERPL-MCNC: 0.71 MG/DL — SIGNIFICANT CHANGE UP (ref 0.5–1.3)
EGFR: 98 ML/MIN/1.73M2 — SIGNIFICANT CHANGE UP
EOSINOPHIL # BLD AUTO: 0.14 K/UL — SIGNIFICANT CHANGE UP (ref 0–0.5)
EOSINOPHIL NFR BLD AUTO: 2.3 % — SIGNIFICANT CHANGE UP (ref 0–6)
GLUCOSE SERPL-MCNC: 123 MG/DL — HIGH (ref 70–99)
HCT VFR BLD CALC: 36 % — SIGNIFICANT CHANGE UP (ref 34.5–45)
HGB BLD-MCNC: 11.6 G/DL — SIGNIFICANT CHANGE UP (ref 11.5–15.5)
IANC: 3.33 K/UL — SIGNIFICANT CHANGE UP (ref 1.8–7.4)
IMM GRANULOCYTES NFR BLD AUTO: 0.8 % — SIGNIFICANT CHANGE UP (ref 0–0.9)
LACTATE BLDV-MCNC: 1.1 MMOL/L — SIGNIFICANT CHANGE UP (ref 0.5–2)
LYMPHOCYTES # BLD AUTO: 2.14 K/UL — SIGNIFICANT CHANGE UP (ref 1–3.3)
LYMPHOCYTES # BLD AUTO: 35.3 % — SIGNIFICANT CHANGE UP (ref 13–44)
MAGNESIUM SERPL-MCNC: 2 MG/DL — SIGNIFICANT CHANGE UP (ref 1.6–2.6)
MCHC RBC-ENTMCNC: 26.9 PG — LOW (ref 27–34)
MCHC RBC-ENTMCNC: 32.2 GM/DL — SIGNIFICANT CHANGE UP (ref 32–36)
MCV RBC AUTO: 83.5 FL — SIGNIFICANT CHANGE UP (ref 80–100)
MONOCYTES # BLD AUTO: 0.37 K/UL — SIGNIFICANT CHANGE UP (ref 0–0.9)
MONOCYTES NFR BLD AUTO: 6.1 % — SIGNIFICANT CHANGE UP (ref 2–14)
NEUTROPHILS # BLD AUTO: 3.33 K/UL — SIGNIFICANT CHANGE UP (ref 1.8–7.4)
NEUTROPHILS NFR BLD AUTO: 55 % — SIGNIFICANT CHANGE UP (ref 43–77)
NRBC # BLD: 0 /100 WBCS — SIGNIFICANT CHANGE UP (ref 0–0)
NRBC # FLD: 0 K/UL — SIGNIFICANT CHANGE UP (ref 0–0)
PHOSPHATE SERPL-MCNC: 3.6 MG/DL — SIGNIFICANT CHANGE UP (ref 2.5–4.5)
PLATELET # BLD AUTO: 278 K/UL — SIGNIFICANT CHANGE UP (ref 150–400)
POTASSIUM SERPL-MCNC: 3.6 MMOL/L — SIGNIFICANT CHANGE UP (ref 3.5–5.3)
POTASSIUM SERPL-SCNC: 3.6 MMOL/L — SIGNIFICANT CHANGE UP (ref 3.5–5.3)
PROT SERPL-MCNC: 6.7 G/DL — SIGNIFICANT CHANGE UP (ref 6–8.3)
RBC # BLD: 4.31 M/UL — SIGNIFICANT CHANGE UP (ref 3.8–5.2)
RBC # FLD: 17.2 % — HIGH (ref 10.3–14.5)
SODIUM SERPL-SCNC: 135 MMOL/L — SIGNIFICANT CHANGE UP (ref 135–145)
WBC # BLD: 6.06 K/UL — SIGNIFICANT CHANGE UP (ref 3.8–10.5)
WBC # FLD AUTO: 6.06 K/UL — SIGNIFICANT CHANGE UP (ref 3.8–10.5)

## 2023-10-07 PROCEDURE — 99233 SBSQ HOSP IP/OBS HIGH 50: CPT | Mod: GC

## 2023-10-07 RX ORDER — HYDROMORPHONE HYDROCHLORIDE 2 MG/ML
0.5 INJECTION INTRAMUSCULAR; INTRAVENOUS; SUBCUTANEOUS ONCE
Refills: 0 | Status: DISCONTINUED | OUTPATIENT
Start: 2023-10-07 | End: 2023-10-07

## 2023-10-07 RX ADMIN — ARIPIPRAZOLE 20 MILLIGRAM(S): 15 TABLET ORAL at 11:31

## 2023-10-07 RX ADMIN — Medication 1 TABLET(S): at 05:59

## 2023-10-07 RX ADMIN — Medication 81 MILLIGRAM(S): at 21:30

## 2023-10-07 RX ADMIN — CHLORHEXIDINE GLUCONATE 1 APPLICATION(S): 213 SOLUTION TOPICAL at 11:33

## 2023-10-07 RX ADMIN — Medication 20 MILLIGRAM(S): at 05:59

## 2023-10-07 RX ADMIN — Medication 25 MICROGRAM(S): at 05:59

## 2023-10-07 RX ADMIN — HYDROMORPHONE HYDROCHLORIDE 0.5 MILLIGRAM(S): 2 INJECTION INTRAMUSCULAR; INTRAVENOUS; SUBCUTANEOUS at 00:16

## 2023-10-07 RX ADMIN — Medication 1000 UNIT(S): at 11:31

## 2023-10-07 RX ADMIN — CLOPIDOGREL BISULFATE 75 MILLIGRAM(S): 75 TABLET, FILM COATED ORAL at 11:30

## 2023-10-07 RX ADMIN — ATORVASTATIN CALCIUM 40 MILLIGRAM(S): 80 TABLET, FILM COATED ORAL at 21:17

## 2023-10-07 RX ADMIN — PANTOPRAZOLE SODIUM 40 MILLIGRAM(S): 20 TABLET, DELAYED RELEASE ORAL at 05:59

## 2023-10-07 RX ADMIN — Medication 1 TABLET(S): at 11:31

## 2023-10-07 RX ADMIN — Medication 10 MILLIGRAM(S): at 11:31

## 2023-10-07 RX ADMIN — BUDESONIDE AND FORMOTEROL FUMARATE DIHYDRATE 2 PUFF(S): 160; 4.5 AEROSOL RESPIRATORY (INHALATION) at 21:17

## 2023-10-07 RX ADMIN — BUDESONIDE AND FORMOTEROL FUMARATE DIHYDRATE 2 PUFF(S): 160; 4.5 AEROSOL RESPIRATORY (INHALATION) at 10:04

## 2023-10-07 RX ADMIN — LAMOTRIGINE 150 MILLIGRAM(S): 25 TABLET, ORALLY DISINTEGRATING ORAL at 11:31

## 2023-10-07 RX ADMIN — PREGABALIN 1000 MICROGRAM(S): 225 CAPSULE ORAL at 11:30

## 2023-10-07 RX ADMIN — Medication 25 MILLIGRAM(S): at 11:30

## 2023-10-07 RX ADMIN — HYDROMORPHONE HYDROCHLORIDE 0.5 MILLIGRAM(S): 2 INJECTION INTRAMUSCULAR; INTRAVENOUS; SUBCUTANEOUS at 00:46

## 2023-10-07 RX ADMIN — Medication 20 MILLIGRAM(S): at 11:31

## 2023-10-07 NOTE — PROGRESS NOTE ADULT - ATTENDING COMMENTS
57 yo F w/ COPD, Meniere's disease s/p R labyrinthectomy, bipolar disorder p/w vertigo, blurry vision, loss of balance when ambulating concern for acute CVA, admitted to MICU for frequent neuro checks after receiving TNK 10/3 944PM  - Latest CT head stable with stable microvascular changes   - Continue ASA/Plavix  - Continue Atorvastatin   - MRI pending   - TTE with bubble study   - Outpatient sleep study   - Transfer to floor vs. discharge home

## 2023-10-07 NOTE — PROGRESS NOTE ADULT - SUBJECTIVE AND OBJECTIVE BOX
Patient is a 58y old  Female who presents with a chief complaint of Dizziness, Difficulty ambulating, Blurry vision (06 Oct 2023 07:20)      24 hour events: ***    OBJECTIVE:  ICU Vital Signs Last 24 Hrs  T(C): 36.7 (07 Oct 2023 04:00), Max: 36.8 (06 Oct 2023 16:00)  T(F): 98.1 (07 Oct 2023 04:00), Max: 98.3 (06 Oct 2023 16:00)  HR: 81 (07 Oct 2023 06:00) (67 - 91)  BP: 127/75 (07 Oct 2023 06:00) (98/48 - 127/75)  BP(mean): 88 (07 Oct 2023 06:00) (56 - 88)  ABP: --  ABP(mean): --  RR: 17 (07 Oct 2023 06:00) (14 - 26)  SpO2: 97% (07 Oct 2023 06:00) (94% - 98%)    O2 Parameters below as of 07 Oct 2023 06:00  Patient On (Oxygen Delivery Method): room air              10-06 @ 07:01  -  10-07 @ 07:00  --------------------------------------------------------  IN: 600 mL / OUT: 600 mL / NET: 0 mL      CAPILLARY BLOOD GLUCOSE          PHYSICAL EXAM:  GENERAL: NAD, well-developed  HEAD:  Atraumatic, Normocephalic  EYES: EOMI, PERRLA, conjunctiva and sclera clear  NECK: Supple, No JVD, Thyroid not palpable, non tender, Trachea midline  CHEST/LUNG: ( )ETT in place, ( )Tracheostomy in place, ( )no chest deformity,  ( )  Normal expansion/effort/palpation,  ( )Normal percussion/auscultation,  Clear to auscultation bilaterally; No wheeze  HEART: Regular rate and rhythm; No murmurs, rubs, or gallops, ( ) No JVD, ( )Normal Pulses, ( )Edema   ABDOMEN: Soft, Nontender, Nondistended; Bowel sounds presen, ( ) No Masses, (  ) No organomegaly,  (  ) Non-tender normal BS   : Rendon in Place, Voiding freely  Musculoskeletal/EXTREMITIES:(  ) Normal strength, movement, and tone, (  ) No focal atropy, (  ) Normal ROM, (  ) Normal digits and nails,  2+ Peripheral Pulses, No clubbing, cyanosis, or edema  PSYCH: AAOx3, (  ) Normal mood/affect/judgment/insight, (  ) Intact memory,   NEUROLOGY: non-focal, exam  SKIN: No rashes or lesions      LINES:    HOSPITAL MEDICATIONS:  MEDICATIONS  (STANDING):  amitriptyline 25 milliGRAM(s) Oral daily  ARIPiprazole 20 milliGRAM(s) Oral daily  aspirin  chewable 81 milliGRAM(s) Oral daily  atorvastatin 40 milliGRAM(s) Oral at bedtime  budesonide  80 MICROgram(s)/formoterol 4.5 MICROgram(s) Inhaler 2 Puff(s) Inhalation two times a day  chlorhexidine 2% Cloths 1 Application(s) Topical daily  cholecalciferol 1000 Unit(s) Oral daily  clopidogrel Tablet 75 milliGRAM(s) Oral daily  cyanocobalamin 1000 MICROGram(s) Oral daily  FLUoxetine 10 milliGRAM(s) Oral daily  furosemide    Tablet 20 milliGRAM(s) Oral daily  influenza   Vaccine 0.5 milliLiter(s) IntraMuscular once  lamoTRIgine 150 milliGRAM(s) Oral daily  levothyroxine 25 MICROGram(s) Oral daily  methylphenidate 20 milliGRAM(s) Oral daily  multivitamin 1 Tablet(s) Oral daily  pantoprazole    Tablet 40 milliGRAM(s) Oral before breakfast  triamterene 37.5 mG/hydrochlorothiazide 25 mG Tablet 1 Tablet(s) Oral daily    MEDICATIONS  (PRN):  albuterol/ipratropium for Nebulization 3 milliLiter(s) Nebulizer every 6 hours PRN Shortness of Breath and/or Wheezing      LABS:                        11.6   6.06  )-----------( 278      ( 07 Oct 2023 05:45 )             36.0     Hgb Trend: 11.6<--, 11.6<--, 11.8<--, 12.3<--  10-07    135  |  96<L>  |  8   ----------------------------<  123<H>  3.6   |  24  |  0.71    Ca    9.0      07 Oct 2023 05:45  Phos  2.8     10-06  Mg     2.00     10-07    TPro  6.7  /  Alb  4.0  /  TBili  0.5  /  DBili  x   /  AST  40<H>  /  ALT  47<H>  /  AlkPhos  115  10-07    Creatinine Trend: 0.71<--, 0.76<--, 0.83<--, 0.87<--  PT/INR - ( 06 Oct 2023 02:54 )   PT: 10.6 sec;   INR: 0.94 ratio         PTT - ( 06 Oct 2023 02:54 )  PTT:34.9 sec  Urinalysis Basic - ( 07 Oct 2023 05:45 )    Color: x / Appearance: x / SG: x / pH: x  Gluc: 123 mg/dL / Ketone: x  / Bili: x / Urobili: x   Blood: x / Protein: x / Nitrite: x   Leuk Esterase: x / RBC: x / WBC x   Sq Epi: x / Non Sq Epi: x / Bacteria: x        Venous Blood Gas:  10-07 @ 05:45  --/--/--/--/--  VBG Lactate: 1.1  Venous Blood Gas:  10-06 @ 02:54  7.41/49/78/31/96.1  VBG Lactate: --      EKG:    MICROBIOLOGY:     RADIOLOGY:  [ ] Reviewed and interpreted by me    EKG:  MICROBIOLOGY:     Radiology: ***    Bedside lung ultrasound: ***    Bedside ECHO: ***    EKG:    CENTRAL LINE: Y/N          DATE INSERTED:              REMOVE: Y/N    RENDON: Y/N                        DATE INSERTED:              REMOVE: Y/N    A-LINE: Y/N                       DATE INSERTED:              REMOVE: Y/N    GLOBAL ISSUE/BEST PRACTICE:  Analgesia:  Sedation:  HOB elevation: yes  Stress ulcer prophylaxis:  VTE prophylaxis:  Glycemic control:  Nutrition:    CODE STATUS: ***  [facility] discussion: Y     Patient is a 58y old  Female who presents with a chief complaint of Dizziness, Difficulty ambulating, Blurry vision (06 Oct 2023 07:20)      24 hour events: Overnight had headache similar in quality and severeity as previously. Relieved wiht Reglan and Dilaudid. When seen and examined by bedside, denies acute complaints, states feeling stronger. Denies chest pain, SOB. Examination strength 5/5 on upper extremities and 4/5 on RLE.    OBJECTIVE:  ICU Vital Signs Last 24 Hrs  T(C): 36.7 (07 Oct 2023 04:00), Max: 36.8 (06 Oct 2023 16:00)  T(F): 98.1 (07 Oct 2023 04:00), Max: 98.3 (06 Oct 2023 16:00)  HR: 81 (07 Oct 2023 06:00) (67 - 91)  BP: 127/75 (07 Oct 2023 06:00) (98/48 - 127/75)  BP(mean): 88 (07 Oct 2023 06:00) (56 - 88)  ABP: --  ABP(mean): --  RR: 17 (07 Oct 2023 06:00) (14 - 26)  SpO2: 97% (07 Oct 2023 06:00) (94% - 98%)    O2 Parameters below as of 07 Oct 2023 06:00  Patient On (Oxygen Delivery Method): room air              10-06 @ 07:01  -  10-07 @ 07:00  --------------------------------------------------------  IN: 600 mL / OUT: 600 mL / NET: 0 mL      CAPILLARY BLOOD GLUCOSE          PHYSICAL EXAM:  GENERAL: NAD, well-developed  HEAD:  Atraumatic, Normocephalic  EYES: EOMI, PERRLA, conjunctiva and sclera clear  NECK: Supple, No JVD, Thyroid not palpable, non tender, Trachea midline  CHEST/LUNG: CTABL, no rhonchi, wheezes, crackles  HEART: Regular rate and rhythm; No murmurs, rubs, or gallops, ( ) No JVD, ( )Normal Pulses, ( )Edema   ABDOMEN: Soft, Nontender, Nondistended; Bowel sounds presen, ( ) No Masses, (  ) No organomegaly,  (  ) Non-tender normal BS   : Rendon in Place, Voiding freely  MSK: Strength 5/5 bilateral upper extremities, 4/5 RLE, 5/5 LLE  PSYCH: AAOx3  NEUROLOGY: CN2-12 intact, Strength 5/5 bilateral upper extremities, 4/5 RLE, 5/5 LLE  SKIN: No rashes or lesions      LINES:    HOSPITAL MEDICATIONS:  MEDICATIONS  (STANDING):  amitriptyline 25 milliGRAM(s) Oral daily  ARIPiprazole 20 milliGRAM(s) Oral daily  aspirin  chewable 81 milliGRAM(s) Oral daily  atorvastatin 40 milliGRAM(s) Oral at bedtime  budesonide  80 MICROgram(s)/formoterol 4.5 MICROgram(s) Inhaler 2 Puff(s) Inhalation two times a day  chlorhexidine 2% Cloths 1 Application(s) Topical daily  cholecalciferol 1000 Unit(s) Oral daily  clopidogrel Tablet 75 milliGRAM(s) Oral daily  cyanocobalamin 1000 MICROGram(s) Oral daily  FLUoxetine 10 milliGRAM(s) Oral daily  furosemide    Tablet 20 milliGRAM(s) Oral daily  influenza   Vaccine 0.5 milliLiter(s) IntraMuscular once  lamoTRIgine 150 milliGRAM(s) Oral daily  levothyroxine 25 MICROGram(s) Oral daily  methylphenidate 20 milliGRAM(s) Oral daily  multivitamin 1 Tablet(s) Oral daily  pantoprazole    Tablet 40 milliGRAM(s) Oral before breakfast  triamterene 37.5 mG/hydrochlorothiazide 25 mG Tablet 1 Tablet(s) Oral daily    MEDICATIONS  (PRN):  albuterol/ipratropium for Nebulization 3 milliLiter(s) Nebulizer every 6 hours PRN Shortness of Breath and/or Wheezing      LABS:                        11.6   6.06  )-----------( 278      ( 07 Oct 2023 05:45 )             36.0     Hgb Trend: 11.6<--, 11.6<--, 11.8<--, 12.3<--  10-07    135  |  96<L>  |  8   ----------------------------<  123<H>  3.6   |  24  |  0.71    Ca    9.0      07 Oct 2023 05:45  Phos  2.8     10-06  Mg     2.00     10-07    TPro  6.7  /  Alb  4.0  /  TBili  0.5  /  DBili  x   /  AST  40<H>  /  ALT  47<H>  /  AlkPhos  115  10-07    Creatinine Trend: 0.71<--, 0.76<--, 0.83<--, 0.87<--  PT/INR - ( 06 Oct 2023 02:54 )   PT: 10.6 sec;   INR: 0.94 ratio         PTT - ( 06 Oct 2023 02:54 )  PTT:34.9 sec  Urinalysis Basic - ( 07 Oct 2023 05:45 )    Color: x / Appearance: x / SG: x / pH: x  Gluc: 123 mg/dL / Ketone: x  / Bili: x / Urobili: x   Blood: x / Protein: x / Nitrite: x   Leuk Esterase: x / RBC: x / WBC x   Sq Epi: x / Non Sq Epi: x / Bacteria: x        Venous Blood Gas:  10-07 @ 05:45  --/--/--/--/--  VBG Lactate: 1.1  Venous Blood Gas:  10-06 @ 02:54  7.41/49/78/31/96.1  VBG Lactate: --      EKG:    MICROBIOLOGY:     RADIOLOGY:  [ ] Reviewed and interpreted by me    EKG:  MICROBIOLOGY:     Radiology: ***    Bedside lung ultrasound: ***    Bedside ECHO: ***    EKG:    CENTRAL LINE: Y/N          DATE INSERTED:              REMOVE: Y/N    RENDON: Y/N                        DATE INSERTED:              REMOVE: Y/N    A-LINE: Y/N                       DATE INSERTED:              REMOVE: Y/N    GLOBAL ISSUE/BEST PRACTICE:  Analgesia:  Sedation:  HOB elevation: yes  Stress ulcer prophylaxis:  VTE prophylaxis:  Glycemic control:  Nutrition:    CODE STATUS: ***  [facility] discussion: Y

## 2023-10-07 NOTE — CHART NOTE - NSCHARTNOTEFT_GEN_A_CORE
MAR Accept Note  Transfer to:  6N  Accepting Attending Physician:  Dylan  Assigned Room:  602A    Patient seen and examined.   Labs and data reviewed.   No findings precluding transfer of service.       HPI/ICU COURSE:   Please refer to MICU transfer note for full details. Briefly, this is KENTRELL FLEMING is a 58y (1965) right handed woman with a PMHx significant for COPD, Meniere's disease s/p R labyrinthectomy, bipolar disorder who presented as code stroke for acute onset dizziness, difficulty ambulating.     Accepted to MICU after tenecteplase was used in ED for presumed stroke. CTH negative x3 over last 36 hours. HA and R sided weakness significantly improved and continues to improve w/o any new or reemerging symptoms. Still w/o focal deficits or other neurologic findings/symptoms. Per neuro recs, started on DAPT (clopidogrel and ASA) and plan for f/u MRI and neuro checks while on tele for arrythmia monitoring.    FOR FOLLOW-UP:  [ ] F/u MRI  [ ] F/u TTE w/ bubble study   [ ] F/u formal s/swallow.    Blue Taylor MD  Internal Medicine PGY-3

## 2023-10-07 NOTE — PROGRESS NOTE ADULT - ASSESSMENT
58 year old right handed woman with a PMHx significant for COPD, Meniere's disease s/p R labyrinthectomy, bipolar disorder p/w vertigo, blurry vision, loss of balance when ambulating c/f CVA, admitted to MICU for close neuro monitoring. HDS and is pending transfer to neuro telemetry floor when bed available.     ======NEURO======  #CVA  - NIHSS 1 on admission (10/3); s/p tenecteplase in the ED (21:44 on 10/3)  - q1h neuro check;   - BP goal < 180/105;   - Telemetry  - CTH and CTA brain/neck (10/3) unrevealing  - Repeated CTH 2/2 concern for new headache; f/u CTH did not show any new intracranial hemorrhage.  - f/u Repeat CTHx2 on 10/4 negative for IC bleeding  - prediabetic A1c, Lipid profile w/ some elevations, Utox negative  - Limited TTE 2/2 poor quality, but f/u w/ bubble study  - c/w Atorvastatin 80mg qd & DAPT per neuro recs  - f/u Neurology recs  - f/u MRI brain w/o con when able    #Meniere's disease  - s/p R labyrinthectomy    #Headaches  - Resolving since admission  - Dilaudid 0.5 with significant improvement over 24 hrs  - Repeated CTHx2 w/no new intracranial hemorrhage appreciated     ======CV======  #No active issues  - BP goal < 180/105    ======PULM======  #COPD  - Not in acute exacerbation  - c/w home medications    ======RENAL======  #No active issues  - Trend sCr, WNL     ======GI======  #No active issues  - Reg diet    ======ENDO======  #No active issues  - prediabetic 5.9 A1C%  - C/w Atorvastatin 80mg QD  - ISS    ======METABOLIC======  #No active issues    ======HEMATOLOGIC======  #DVT ppx  - s/p tenecteplase in the ED (21:44 on 10/3)  - SCDs & also DAPT now per neuro recs    ======ID======  #No active issues    ======Ethics/Prophylaxis======  Code: Full code  DVT: SCDs       58 year old right handed woman with a PMHx significant for COPD, Meniere's disease s/p R labyrinthectomy, bipolar disorder p/w vertigo, blurry vision, loss of balance when ambulating c/f CVA, admitted to MICU for close neuro monitoring. HDS and is pending transfer to neuro telemetry floor when bed available.     ======NEURO======  #CVA  - NIHSS 1 on admission (10/3); s/p tenecteplase in the ED (21:44 on 10/3)  - BP goal < 180/105;   - Telemetry  - CTH and CTA brain/neck (10/3) unrevealing  - Repeated CTH 2/2 concern for new headache; f/u CTH did not show any new intracranial hemorrhage.  - f/u Repeat CTHx2 on 10/4 negative for IC bleeding  - prediabetic A1c, Lipid profile w/ some elevations, Utox negative  - Limited TTE 2/2 poor quality, but f/u w/ bubble study  - c/w Atorvastatin 80mg qd & DAPT per neuro recs  - f/u Neurology recs  - f/u MRI brain w/o con when able    #Meniere's disease  - s/p R labyrinthectomy    #Headaches  - Resolving since admission  - Dilaudid 0.5 with significant improvement over 24 hrs  - Repeated CTHx2 w/no new intracranial hemorrhage appreciated     ======CV======  #No active issues  - BP goal < 180/105    ======PULM======  #COPD  - Not in acute exacerbation  - c/w home medications    ======RENAL======  #No active issues  - Trend sCr, WNL     ======GI======  #No active issues  - Reg diet    ======ENDO======  #No active issues  - prediabetic 5.9 A1C%  - C/w Atorvastatin 80mg QD  - ISS    ======METABOLIC======  #No active issues    ======HEMATOLOGIC======  #DVT ppx  - s/p tenecteplase in the ED (21:44 on 10/3)  - SCDs & also DAPT now per neuro recs    ======ID======  #No active issues    ======Ethics/Prophylaxis======  Code: Full code  DVT: SCDs       58 year old right handed woman with a PMHx significant for COPD, Meniere's disease s/p R labyrinthectomy, bipolar disorder p/w vertigo, blurry vision, loss of balance when ambulating c/f CVA, admitted to MICU for close neuro monitoring. HDS and is pending transfer to neuro telemetry floor when bed available.     ======NEURO======  #CVA  - NIHSS 1 on admission (10/3); s/p tenecteplase in the ED (21:44 on 10/3)  - BP goal < 180/105;   - Telemetry  - CTH and CTA brain/neck (10/3) unrevealing  - Repeated CTH 2/2 concern for new headache; f/u CTH did not show any new intracranial hemorrhage.  - f/u Repeat CTHx2 on 10/4 negative for IC bleeding  - prediabetic A1c, Lipid profile w/ some elevations, Utox negative  - Limited TTE 2/2 poor quality, but f/u w/ bubble study  - c/w Atorvastatin 80mg qd & DAPT per neuro recs  - f/u Neurology recs  - f/u MRI brain w/o con when able    #Meniere's disease  - s/p R labyrinthectomy    #Headaches  - Resolving since admission  - Dilaudid 0.5 with significant improvement over 24 hrs  - Repeated CTHx2 w/no new intracranial hemorrhage appreciated     ======CV======  #No active issues  - BP goal < 180/105    ======PULM======  #COPD  - Not in acute exacerbation  - c/w home medications    ======RENAL======  #No active issues  - Trend sCr, WNL     ======GI======  #No active issues  - Reg diet    ======ENDO======  #No active issues  - prediabetic 5.9 A1C%  - C/w Atorvastatin 80mg QD  - ISS    ======METABOLIC======  #No active issues    ======HEMATOLOGIC======  #DVT ppx  - s/p tenecteplase in the ED (21:44 on 10/3)  - SCDs  - c/w DAPT ASA + Plavix    ======ID======  #No active issues    ======Ethics/Prophylaxis======  Code: Full code  DVT: SCDs

## 2023-10-08 DIAGNOSIS — Z29.9 ENCOUNTER FOR PROPHYLACTIC MEASURES, UNSPECIFIED: ICD-10-CM

## 2023-10-08 DIAGNOSIS — F99 MENTAL DISORDER, NOT OTHERWISE SPECIFIED: ICD-10-CM

## 2023-10-08 DIAGNOSIS — J44.9 CHRONIC OBSTRUCTIVE PULMONARY DISEASE, UNSPECIFIED: ICD-10-CM

## 2023-10-08 DIAGNOSIS — R53.1 WEAKNESS: ICD-10-CM

## 2023-10-08 DIAGNOSIS — Z86.69 PERSONAL HISTORY OF OTHER DISEASES OF THE NERVOUS SYSTEM AND SENSE ORGANS: ICD-10-CM

## 2023-10-08 LAB
ALBUMIN SERPL ELPH-MCNC: 4.3 G/DL — SIGNIFICANT CHANGE UP (ref 3.3–5)
ALP SERPL-CCNC: 123 U/L — HIGH (ref 40–120)
ALT FLD-CCNC: 45 U/L — HIGH (ref 4–33)
ANION GAP SERPL CALC-SCNC: 12 MMOL/L — SIGNIFICANT CHANGE UP (ref 7–14)
AST SERPL-CCNC: 34 U/L — HIGH (ref 4–32)
BASOPHILS # BLD AUTO: 0.03 K/UL — SIGNIFICANT CHANGE UP (ref 0–0.2)
BASOPHILS NFR BLD AUTO: 0.4 % — SIGNIFICANT CHANGE UP (ref 0–2)
BILIRUB SERPL-MCNC: 0.4 MG/DL — SIGNIFICANT CHANGE UP (ref 0.2–1.2)
BUN SERPL-MCNC: 7 MG/DL — SIGNIFICANT CHANGE UP (ref 7–23)
CALCIUM SERPL-MCNC: 9.4 MG/DL — SIGNIFICANT CHANGE UP (ref 8.4–10.5)
CHLORIDE SERPL-SCNC: 102 MMOL/L — SIGNIFICANT CHANGE UP (ref 98–107)
CO2 SERPL-SCNC: 26 MMOL/L — SIGNIFICANT CHANGE UP (ref 22–31)
CREAT SERPL-MCNC: 0.68 MG/DL — SIGNIFICANT CHANGE UP (ref 0.5–1.3)
EGFR: 101 ML/MIN/1.73M2 — SIGNIFICANT CHANGE UP
EOSINOPHIL # BLD AUTO: 0.14 K/UL — SIGNIFICANT CHANGE UP (ref 0–0.5)
EOSINOPHIL NFR BLD AUTO: 1.8 % — SIGNIFICANT CHANGE UP (ref 0–6)
GLUCOSE SERPL-MCNC: 126 MG/DL — HIGH (ref 70–99)
HCT VFR BLD CALC: 40.2 % — SIGNIFICANT CHANGE UP (ref 34.5–45)
HGB BLD-MCNC: 12.8 G/DL — SIGNIFICANT CHANGE UP (ref 11.5–15.5)
IANC: 4.75 K/UL — SIGNIFICANT CHANGE UP (ref 1.8–7.4)
IMM GRANULOCYTES NFR BLD AUTO: 0.9 % — SIGNIFICANT CHANGE UP (ref 0–0.9)
LYMPHOCYTES # BLD AUTO: 2.18 K/UL — SIGNIFICANT CHANGE UP (ref 1–3.3)
LYMPHOCYTES # BLD AUTO: 28.7 % — SIGNIFICANT CHANGE UP (ref 13–44)
MAGNESIUM SERPL-MCNC: 2.2 MG/DL — SIGNIFICANT CHANGE UP (ref 1.6–2.6)
MCHC RBC-ENTMCNC: 26.9 PG — LOW (ref 27–34)
MCHC RBC-ENTMCNC: 31.8 GM/DL — LOW (ref 32–36)
MCV RBC AUTO: 84.5 FL — SIGNIFICANT CHANGE UP (ref 80–100)
MONOCYTES # BLD AUTO: 0.43 K/UL — SIGNIFICANT CHANGE UP (ref 0–0.9)
MONOCYTES NFR BLD AUTO: 5.7 % — SIGNIFICANT CHANGE UP (ref 2–14)
NEUTROPHILS # BLD AUTO: 4.75 K/UL — SIGNIFICANT CHANGE UP (ref 1.8–7.4)
NEUTROPHILS NFR BLD AUTO: 62.5 % — SIGNIFICANT CHANGE UP (ref 43–77)
NRBC # BLD: 0 /100 WBCS — SIGNIFICANT CHANGE UP (ref 0–0)
NRBC # FLD: 0 K/UL — SIGNIFICANT CHANGE UP (ref 0–0)
PHOSPHATE SERPL-MCNC: 3.8 MG/DL — SIGNIFICANT CHANGE UP (ref 2.5–4.5)
PLATELET # BLD AUTO: 282 K/UL — SIGNIFICANT CHANGE UP (ref 150–400)
POTASSIUM SERPL-MCNC: 4.3 MMOL/L — SIGNIFICANT CHANGE UP (ref 3.5–5.3)
POTASSIUM SERPL-SCNC: 4.3 MMOL/L — SIGNIFICANT CHANGE UP (ref 3.5–5.3)
PROT SERPL-MCNC: 7 G/DL — SIGNIFICANT CHANGE UP (ref 6–8.3)
RBC # BLD: 4.76 M/UL — SIGNIFICANT CHANGE UP (ref 3.8–5.2)
RBC # FLD: 17.6 % — HIGH (ref 10.3–14.5)
SODIUM SERPL-SCNC: 140 MMOL/L — SIGNIFICANT CHANGE UP (ref 135–145)
WBC # BLD: 7.6 K/UL — SIGNIFICANT CHANGE UP (ref 3.8–10.5)
WBC # FLD AUTO: 7.6 K/UL — SIGNIFICANT CHANGE UP (ref 3.8–10.5)

## 2023-10-08 PROCEDURE — 99233 SBSQ HOSP IP/OBS HIGH 50: CPT

## 2023-10-08 RX ORDER — ACETAMINOPHEN 500 MG
650 TABLET ORAL ONCE
Refills: 0 | Status: COMPLETED | OUTPATIENT
Start: 2023-10-08 | End: 2023-10-08

## 2023-10-08 RX ORDER — LANOLIN ALCOHOL/MO/W.PET/CERES
3 CREAM (GRAM) TOPICAL AT BEDTIME
Refills: 0 | Status: ACTIVE | OUTPATIENT
Start: 2023-10-08 | End: 2024-09-05

## 2023-10-08 RX ADMIN — Medication 20 MILLIGRAM(S): at 21:35

## 2023-10-08 RX ADMIN — Medication 20 MILLIGRAM(S): at 05:53

## 2023-10-08 RX ADMIN — Medication 1 TABLET(S): at 05:53

## 2023-10-08 RX ADMIN — Medication 25 MILLIGRAM(S): at 21:32

## 2023-10-08 RX ADMIN — Medication 650 MILLIGRAM(S): at 22:00

## 2023-10-08 RX ADMIN — Medication 650 MILLIGRAM(S): at 21:31

## 2023-10-08 RX ADMIN — Medication 25 MICROGRAM(S): at 05:53

## 2023-10-08 RX ADMIN — ATORVASTATIN CALCIUM 40 MILLIGRAM(S): 80 TABLET, FILM COATED ORAL at 21:31

## 2023-10-08 RX ADMIN — Medication 1 TABLET(S): at 12:38

## 2023-10-08 RX ADMIN — ARIPIPRAZOLE 20 MILLIGRAM(S): 15 TABLET ORAL at 21:32

## 2023-10-08 RX ADMIN — Medication 10 MILLIGRAM(S): at 21:31

## 2023-10-08 RX ADMIN — BUDESONIDE AND FORMOTEROL FUMARATE DIHYDRATE 2 PUFF(S): 160; 4.5 AEROSOL RESPIRATORY (INHALATION) at 12:39

## 2023-10-08 RX ADMIN — CLOPIDOGREL BISULFATE 75 MILLIGRAM(S): 75 TABLET, FILM COATED ORAL at 12:38

## 2023-10-08 RX ADMIN — Medication 1000 UNIT(S): at 12:38

## 2023-10-08 RX ADMIN — PREGABALIN 1000 MICROGRAM(S): 225 CAPSULE ORAL at 12:38

## 2023-10-08 RX ADMIN — BUDESONIDE AND FORMOTEROL FUMARATE DIHYDRATE 2 PUFF(S): 160; 4.5 AEROSOL RESPIRATORY (INHALATION) at 21:33

## 2023-10-08 RX ADMIN — Medication 81 MILLIGRAM(S): at 21:31

## 2023-10-08 RX ADMIN — LAMOTRIGINE 150 MILLIGRAM(S): 25 TABLET, ORALLY DISINTEGRATING ORAL at 12:38

## 2023-10-08 RX ADMIN — PANTOPRAZOLE SODIUM 40 MILLIGRAM(S): 20 TABLET, DELAYED RELEASE ORAL at 06:46

## 2023-10-08 NOTE — PROGRESS NOTE ADULT - ASSESSMENT
Patient is a 57yo F with PMH of COPD, Meniere’s disease s/p R labyrinthectomy, and bipolar disorder who presented with acute onset dizziness, blurry vision, and difficulty ambulating due to right-sided weakness. She was given Tenecteplase for suspected stroke and kept in MICU for monitoring, now transferred to telemetry pending MRI brain.

## 2023-10-08 NOTE — PROGRESS NOTE ADULT - SUBJECTIVE AND OBJECTIVE BOX
[facility] Division of Hospital Medicine  Darren Nur   Available via MS Teams  In house pager [phone]    SUBJECTIVE / OVERNIGHT EVENTS:  Transferred to telemetry from ICU.  No acute complaints. States her deficits have largely resolved, including her weakness.  Awaiting MRI, but thinking she might even be able to have it done outpatient.    ADDITIONAL REVIEW OF SYSTEMS:    MEDICATIONS  (STANDING):  amitriptyline 25 milliGRAM(s) Oral daily  ARIPiprazole 20 milliGRAM(s) Oral daily  aspirin  chewable 81 milliGRAM(s) Oral daily  atorvastatin 40 milliGRAM(s) Oral at bedtime  budesonide  80 MICROgram(s)/formoterol 4.5 MICROgram(s) Inhaler 2 Puff(s) Inhalation two times a day  cholecalciferol 1000 Unit(s) Oral daily  clopidogrel Tablet 75 milliGRAM(s) Oral daily  cyanocobalamin 1000 MICROGram(s) Oral daily  FLUoxetine 10 milliGRAM(s) Oral daily  furosemide    Tablet 20 milliGRAM(s) Oral daily  influenza   Vaccine 0.5 milliLiter(s) IntraMuscular once  lamoTRIgine 150 milliGRAM(s) Oral daily  levothyroxine 25 MICROGram(s) Oral daily  methylphenidate 20 milliGRAM(s) Oral daily  multivitamin 1 Tablet(s) Oral daily  pantoprazole    Tablet 40 milliGRAM(s) Oral before breakfast  triamterene 37.5 mG/hydrochlorothiazide 25 mG Tablet 1 Tablet(s) Oral daily    MEDICATIONS  (PRN):  albuterol/ipratropium for Nebulization 3 milliLiter(s) Nebulizer every 6 hours PRN Shortness of Breath and/or Wheezing      I&O's Summary    07 Oct 2023 07:01  -  08 Oct 2023 07:00  --------------------------------------------------------  IN: 720 mL / OUT: 0 mL / NET: 720 mL        PHYSICAL EXAM:  Vital Signs Last 24 Hrs  T(C): 36.8 (08 Oct 2023 10:30), Max: 37 (08 Oct 2023 06:31)  T(F): 98.2 (08 Oct 2023 10:30), Max: 98.6 (08 Oct 2023 06:31)  HR: 73 (08 Oct 2023 10:30) (73 - 94)  BP: 102/64 (08 Oct 2023 10:30) (102/64 - 123/59)  BP(mean): 65 (07 Oct 2023 16:00) (65 - 74)  RR: 18 (08 Oct 2023 10:30) (15 - 24)  SpO2: 100% (08 Oct 2023 10:30) (96% - 100%)    Parameters below as of 08 Oct 2023 10:30  Patient On (Oxygen Delivery Method): room air      CONSTITUTIONAL: NAD, well-groomed  EYES: PERRLA; conjunctiva and sclera clear  ENMT: Moist oral mucosa, no pharyngeal injection or exudates; normal dentition  NECK: Supple, no palpable masses; no thyromegaly  RESPIRATORY: Normal respiratory effort; lungs are clear to auscultation bilaterally  CARDIOVASCULAR: Regular rate and rhythm, normal S1 and S2, no murmur/rub/gallop; No lower extremity edema; Peripheral pulses are 2+ bilaterally  ABDOMEN: Nontender to palpation, normoactive bowel sounds, no rebound/guarding; No hepatosplenomegaly  MUSCULOSKELETAL:  no clubbing or cyanosis of digits; no joint swelling or tenderness to palpation  PSYCH: A+O to person, place, and time; affect appropriate  NEUROLOGY: CN 2-12 are intact and symmetric; no gross sensory deficits   SKIN: No rashes; no palpable lesions    LABS:                        12.8   7.60  )-----------( 282      ( 08 Oct 2023 05:55 )             40.2     10-08    140  |  102  |  7   ----------------------------<  126<H>  4.3   |  26  |  0.68    Ca    9.4      08 Oct 2023 05:55  Phos  3.8     10-08  Mg     2.20     10-08    TPro  7.0  /  Alb  4.3  /  TBili  0.4  /  DBili  x   /  AST  34<H>  /  ALT  45<H>  /  AlkPhos  123<H>  10-08          Urinalysis Basic - ( 08 Oct 2023 05:55 )    Color: x / Appearance: x / SG: x / pH: x  Gluc: 126 mg/dL / Ketone: x  / Bili: x / Urobili: x   Blood: x / Protein: x / Nitrite: x   Leuk Esterase: x / RBC: x / WBC x   Sq Epi: x / Non Sq Epi: x / Bacteria: x

## 2023-10-08 NOTE — PROGRESS NOTE ADULT - PROBLEM SELECTOR PLAN 1
#Acute right-sided weakness, suspected stroke, possible acute vestibular syndrome  - NIHSS 1 on admission  - s/p Tenecteplase 10pm on 10/3/23  - telemetry monitoring  - neurology following – suspect acute vestibular syndrome but cannot rule out acute infarct  - continue with atorvastatin 40mg po qhs – LFTs mildly elevated but largely stable  - TTE w/ bubble study unremarkable  - pending MRI brain  - PT: outpatient PT  - OT: rehab; deficits seem to have improved, likely requires re-eval #Acute right-sided weakness, suspected stroke, possible acute vestibular syndrome  - NIHSS 1 on admission  - s/p Tenecteplase 10pm on 10/3/23  - telemetry monitoring  - neurology following – suspect acute vestibular syndrome but cannot rule out acute infarct  - continue with atorvastatin 40mg po qhs – LFTs mildly elevated but largely stable  - TTE w/ bubble study unremarkable  - pending MRI brain  - PT: outpatient PT  - OT: rehab; deficits seem to have improved, likely requires re-eval  - c/w DAPT for now, likely 3 weeks, then aspirin monotherapy indefinitely  - BP control with home meds - on triamterene/HCTZ

## 2023-10-09 LAB
ALBUMIN SERPL ELPH-MCNC: 3.8 G/DL — SIGNIFICANT CHANGE UP (ref 3.3–5)
ALP SERPL-CCNC: 119 U/L — SIGNIFICANT CHANGE UP (ref 40–120)
ALT FLD-CCNC: 38 U/L — HIGH (ref 4–33)
ANION GAP SERPL CALC-SCNC: 13 MMOL/L — SIGNIFICANT CHANGE UP (ref 7–14)
AST SERPL-CCNC: 24 U/L — SIGNIFICANT CHANGE UP (ref 4–32)
BASOPHILS # BLD AUTO: 0.03 K/UL — SIGNIFICANT CHANGE UP (ref 0–0.2)
BASOPHILS NFR BLD AUTO: 0.4 % — SIGNIFICANT CHANGE UP (ref 0–2)
BILIRUB SERPL-MCNC: 0.4 MG/DL — SIGNIFICANT CHANGE UP (ref 0.2–1.2)
BUN SERPL-MCNC: 11 MG/DL — SIGNIFICANT CHANGE UP (ref 7–23)
CALCIUM SERPL-MCNC: 9.4 MG/DL — SIGNIFICANT CHANGE UP (ref 8.4–10.5)
CHLORIDE SERPL-SCNC: 102 MMOL/L — SIGNIFICANT CHANGE UP (ref 98–107)
CO2 SERPL-SCNC: 25 MMOL/L — SIGNIFICANT CHANGE UP (ref 22–31)
CREAT SERPL-MCNC: 0.76 MG/DL — SIGNIFICANT CHANGE UP (ref 0.5–1.3)
EGFR: 91 ML/MIN/1.73M2 — SIGNIFICANT CHANGE UP
EOSINOPHIL # BLD AUTO: 0.16 K/UL — SIGNIFICANT CHANGE UP (ref 0–0.5)
EOSINOPHIL NFR BLD AUTO: 2.2 % — SIGNIFICANT CHANGE UP (ref 0–6)
GLUCOSE SERPL-MCNC: 116 MG/DL — HIGH (ref 70–99)
HCT VFR BLD CALC: 37.8 % — SIGNIFICANT CHANGE UP (ref 34.5–45)
HGB BLD-MCNC: 12.3 G/DL — SIGNIFICANT CHANGE UP (ref 11.5–15.5)
IANC: 4.21 K/UL — SIGNIFICANT CHANGE UP (ref 1.8–7.4)
IMM GRANULOCYTES NFR BLD AUTO: 1.1 % — HIGH (ref 0–0.9)
LYMPHOCYTES # BLD AUTO: 2.35 K/UL — SIGNIFICANT CHANGE UP (ref 1–3.3)
LYMPHOCYTES # BLD AUTO: 32.1 % — SIGNIFICANT CHANGE UP (ref 13–44)
MAGNESIUM SERPL-MCNC: 2.1 MG/DL — SIGNIFICANT CHANGE UP (ref 1.6–2.6)
MCHC RBC-ENTMCNC: 27.3 PG — SIGNIFICANT CHANGE UP (ref 27–34)
MCHC RBC-ENTMCNC: 32.5 GM/DL — SIGNIFICANT CHANGE UP (ref 32–36)
MCV RBC AUTO: 84 FL — SIGNIFICANT CHANGE UP (ref 80–100)
MONOCYTES # BLD AUTO: 0.49 K/UL — SIGNIFICANT CHANGE UP (ref 0–0.9)
MONOCYTES NFR BLD AUTO: 6.7 % — SIGNIFICANT CHANGE UP (ref 2–14)
NEUTROPHILS # BLD AUTO: 4.21 K/UL — SIGNIFICANT CHANGE UP (ref 1.8–7.4)
NEUTROPHILS NFR BLD AUTO: 57.5 % — SIGNIFICANT CHANGE UP (ref 43–77)
NRBC # BLD: 0 /100 WBCS — SIGNIFICANT CHANGE UP (ref 0–0)
NRBC # FLD: 0 K/UL — SIGNIFICANT CHANGE UP (ref 0–0)
PHOSPHATE SERPL-MCNC: 4.9 MG/DL — HIGH (ref 2.5–4.5)
PLATELET # BLD AUTO: 271 K/UL — SIGNIFICANT CHANGE UP (ref 150–400)
POTASSIUM SERPL-MCNC: 4.4 MMOL/L — SIGNIFICANT CHANGE UP (ref 3.5–5.3)
POTASSIUM SERPL-SCNC: 4.4 MMOL/L — SIGNIFICANT CHANGE UP (ref 3.5–5.3)
PROT SERPL-MCNC: 6.4 G/DL — SIGNIFICANT CHANGE UP (ref 6–8.3)
RBC # BLD: 4.5 M/UL — SIGNIFICANT CHANGE UP (ref 3.8–5.2)
RBC # FLD: 17.8 % — HIGH (ref 10.3–14.5)
SODIUM SERPL-SCNC: 140 MMOL/L — SIGNIFICANT CHANGE UP (ref 135–145)
WBC # BLD: 7.32 K/UL — SIGNIFICANT CHANGE UP (ref 3.8–10.5)
WBC # FLD AUTO: 7.32 K/UL — SIGNIFICANT CHANGE UP (ref 3.8–10.5)

## 2023-10-09 PROCEDURE — 70551 MRI BRAIN STEM W/O DYE: CPT | Mod: 26

## 2023-10-09 PROCEDURE — 99232 SBSQ HOSP IP/OBS MODERATE 35: CPT

## 2023-10-09 RX ADMIN — PREGABALIN 1000 MICROGRAM(S): 225 CAPSULE ORAL at 12:42

## 2023-10-09 RX ADMIN — Medication 1000 UNIT(S): at 12:42

## 2023-10-09 RX ADMIN — Medication 1 TABLET(S): at 12:41

## 2023-10-09 RX ADMIN — BUDESONIDE AND FORMOTEROL FUMARATE DIHYDRATE 2 PUFF(S): 160; 4.5 AEROSOL RESPIRATORY (INHALATION) at 21:44

## 2023-10-09 RX ADMIN — CLOPIDOGREL BISULFATE 75 MILLIGRAM(S): 75 TABLET, FILM COATED ORAL at 12:41

## 2023-10-09 RX ADMIN — BUDESONIDE AND FORMOTEROL FUMARATE DIHYDRATE 2 PUFF(S): 160; 4.5 AEROSOL RESPIRATORY (INHALATION) at 10:38

## 2023-10-09 RX ADMIN — Medication 3 MILLIGRAM(S): at 21:44

## 2023-10-09 RX ADMIN — Medication 10 MILLIGRAM(S): at 12:42

## 2023-10-09 RX ADMIN — Medication 1 TABLET(S): at 05:32

## 2023-10-09 RX ADMIN — ATORVASTATIN CALCIUM 40 MILLIGRAM(S): 80 TABLET, FILM COATED ORAL at 21:45

## 2023-10-09 RX ADMIN — PANTOPRAZOLE SODIUM 40 MILLIGRAM(S): 20 TABLET, DELAYED RELEASE ORAL at 05:29

## 2023-10-09 RX ADMIN — ARIPIPRAZOLE 20 MILLIGRAM(S): 15 TABLET ORAL at 12:40

## 2023-10-09 RX ADMIN — Medication 20 MILLIGRAM(S): at 05:29

## 2023-10-09 RX ADMIN — Medication 25 MILLIGRAM(S): at 12:41

## 2023-10-09 RX ADMIN — Medication 81 MILLIGRAM(S): at 21:45

## 2023-10-09 RX ADMIN — Medication 25 MICROGRAM(S): at 05:29

## 2023-10-09 RX ADMIN — LAMOTRIGINE 150 MILLIGRAM(S): 25 TABLET, ORALLY DISINTEGRATING ORAL at 12:41

## 2023-10-09 NOTE — PROGRESS NOTE ADULT - PROBLEM SELECTOR PLAN 4
#Bipolar disorder/other psychiatric illnesses  - continue home Abilify, Prozac, methylphenidate, lamotrigine, and amitryptiline
#Bipolar disorder/other psychiatric illnesses  - continue home Abilify, Prozac, methylphenidate, lamotrigine, and amitryptiline

## 2023-10-09 NOTE — PROGRESS NOTE ADULT - ASSESSMENT
Patient is a 59yo F with PMH of COPD, Meniere’s disease s/p R labyrinthectomy, and bipolar disorder who presented with acute onset dizziness, blurry vision, and difficulty ambulating due to right-sided weakness. She was given Tenecteplase for suspected stroke and kept in MICU for monitoring, now transferred to telemetry pending MRI brain.

## 2023-10-09 NOTE — PROGRESS NOTE ADULT - PROBLEM SELECTOR PLAN 2
- PRN sapna  - c/w symbicort  - not in acute exacerbation, not requiring supplemental O2
- PRN sapna  - c/w symbicort  - not in acute exacerbation, not requiring supplemental O2

## 2023-10-09 NOTE — PROGRESS NOTE ADULT - SUBJECTIVE AND OBJECTIVE BOX
[facility] Division of Hospital Medicine  Ronny Talbert MD  Pager [phone]      Patient is a 58y old  Female who presents with a chief complaint of Dizziness, Difficulty ambulating, Blurry vision (08 Oct 2023 11:48)      SUBJECTIVE / OVERNIGHT EVENTS: Denies weakness, unsteady gait or paresthesia    MEDICATIONS  (STANDING):  amitriptyline 25 milliGRAM(s) Oral daily  ARIPiprazole 20 milliGRAM(s) Oral daily  aspirin  chewable 81 milliGRAM(s) Oral daily  atorvastatin 40 milliGRAM(s) Oral at bedtime  budesonide  80 MICROgram(s)/formoterol 4.5 MICROgram(s) Inhaler 2 Puff(s) Inhalation two times a day  cholecalciferol 1000 Unit(s) Oral daily  clopidogrel Tablet 75 milliGRAM(s) Oral daily  cyanocobalamin 1000 MICROGram(s) Oral daily  FLUoxetine 10 milliGRAM(s) Oral daily  furosemide    Tablet 20 milliGRAM(s) Oral daily  influenza   Vaccine 0.5 milliLiter(s) IntraMuscular once  lamoTRIgine 150 milliGRAM(s) Oral daily  levothyroxine 25 MICROGram(s) Oral daily  methylphenidate 20 milliGRAM(s) Oral daily  multivitamin 1 Tablet(s) Oral daily  pantoprazole    Tablet 40 milliGRAM(s) Oral before breakfast  triamterene 37.5 mG/hydrochlorothiazide 25 mG Tablet 1 Tablet(s) Oral daily    MEDICATIONS  (PRN):  albuterol/ipratropium for Nebulization 3 milliLiter(s) Nebulizer every 6 hours PRN Shortness of Breath and/or Wheezing  melatonin 3 milliGRAM(s) Oral at bedtime PRN Insomnia      CAPILLARY BLOOD GLUCOSE        I&O's Summary      PHYSICAL EXAM:  Vital Signs Last 24 Hrs  T(C): 36.7 (09 Oct 2023 17:00), Max: 36.7 (09 Oct 2023 17:00)  T(F): 98.1 (09 Oct 2023 17:00), Max: 98.1 (09 Oct 2023 17:00)  HR: 88 (09 Oct 2023 17:00) (72 - 88)  BP: 135/69 (09 Oct 2023 17:00) (110/79 - 135/69)  BP(mean): --  RR: 18 (09 Oct 2023 17:00) (17 - 18)  SpO2: 97% (09 Oct 2023 17:00) (96% - 97%)    Parameters below as of 09 Oct 2023 17:00  Patient On (Oxygen Delivery Method): room air      CONSTITUTIONAL: NAD  EYES: conjunctiva and sclera clear  ENMT: mmm  NECK: Supple,  RESPIRATORY: Normal respiratory effort; lungs are clear to auscultation bilaterally  CARDIOVASCULAR: Regular rate and rhythm, + S1 and S2  ABDOMEN: Nontender to palpation, normoactive bowel sounds, no rebound/guarding  PSYCH: A+O x 3  NEURO: 5/5 strength in all ext, normal gait    LABS:                        12.3   7.32  )-----------( 271      ( 09 Oct 2023 05:03 )             37.8     10-09    140  |  102  |  11  ----------------------------<  116<H>  4.4   |  25  |  0.76    Ca    9.4      09 Oct 2023 05:03  Phos  4.9     10-09  Mg     2.10     10-09    TPro  6.4  /  Alb  3.8  /  TBili  0.4  /  DBili  x   /  AST  24  /  ALT  38<H>  /  AlkPhos  119  10-09          Urinalysis Basic - ( 09 Oct 2023 05:03 )    Color: x / Appearance: x / SG: x / pH: x  Gluc: 116 mg/dL / Ketone: x  / Bili: x / Urobili: x   Blood: x / Protein: x / Nitrite: x   Leuk Esterase: x / RBC: x / WBC x   Sq Epi: x / Non Sq Epi: x / Bacteria: x

## 2023-10-09 NOTE — PROGRESS NOTE ADULT - PROBLEM SELECTOR PLAN 1
#Acute right-sided weakness, suspected stroke, possible acute vestibular syndrome  - NIHSS 1 on admission  - s/p Tenecteplase 10pm on 10/3/23  - telemetry monitoring  - neurology following – suspect acute vestibular syndrome but cannot rule out acute infarct  - continue with atorvastatin 40mg po qhs – LFTs mildly elevated but largely stable  - TTE w/ bubble study unremarkable  - pending MRI brain  - PT: outpatient PT  - OT: rehab; deficits seem to have improved, likely requires re-eval, f/u OT eval  - c/w DAPT for now, likely 3 weeks, then aspirin monotherapy indefinitely  - BP control with home meds - on triamterene/HCTZ

## 2023-10-09 NOTE — PROGRESS NOTE ADULT - PROBLEM SELECTOR PLAN 5
DVT PPX: SCDs    Plan discussed with medicine ACP.
DVT PPX: SCDs    Plan discussed with medicine ACP.

## 2023-10-10 VITALS
DIASTOLIC BLOOD PRESSURE: 61 MMHG | RESPIRATION RATE: 18 BRPM | TEMPERATURE: 98 F | SYSTOLIC BLOOD PRESSURE: 114 MMHG | HEART RATE: 78 BPM | OXYGEN SATURATION: 100 %

## 2023-10-10 LAB
ALBUMIN SERPL ELPH-MCNC: 3.9 G/DL — SIGNIFICANT CHANGE UP (ref 3.3–5)
ALP SERPL-CCNC: 112 U/L — SIGNIFICANT CHANGE UP (ref 40–120)
ALT FLD-CCNC: 34 U/L — HIGH (ref 4–33)
ANION GAP SERPL CALC-SCNC: 13 MMOL/L — SIGNIFICANT CHANGE UP (ref 7–14)
AST SERPL-CCNC: 25 U/L — SIGNIFICANT CHANGE UP (ref 4–32)
BASOPHILS # BLD AUTO: 0.04 K/UL — SIGNIFICANT CHANGE UP (ref 0–0.2)
BASOPHILS NFR BLD AUTO: 0.5 % — SIGNIFICANT CHANGE UP (ref 0–2)
BILIRUB SERPL-MCNC: 0.3 MG/DL — SIGNIFICANT CHANGE UP (ref 0.2–1.2)
BUN SERPL-MCNC: 12 MG/DL — SIGNIFICANT CHANGE UP (ref 7–23)
CALCIUM SERPL-MCNC: 9.4 MG/DL — SIGNIFICANT CHANGE UP (ref 8.4–10.5)
CHLORIDE SERPL-SCNC: 99 MMOL/L — SIGNIFICANT CHANGE UP (ref 98–107)
CO2 SERPL-SCNC: 25 MMOL/L — SIGNIFICANT CHANGE UP (ref 22–31)
CREAT SERPL-MCNC: 0.75 MG/DL — SIGNIFICANT CHANGE UP (ref 0.5–1.3)
EGFR: 92 ML/MIN/1.73M2 — SIGNIFICANT CHANGE UP
EOSINOPHIL # BLD AUTO: 0.14 K/UL — SIGNIFICANT CHANGE UP (ref 0–0.5)
EOSINOPHIL NFR BLD AUTO: 1.7 % — SIGNIFICANT CHANGE UP (ref 0–6)
GLUCOSE SERPL-MCNC: 112 MG/DL — HIGH (ref 70–99)
HCT VFR BLD CALC: 36.6 % — SIGNIFICANT CHANGE UP (ref 34.5–45)
HGB BLD-MCNC: 12 G/DL — SIGNIFICANT CHANGE UP (ref 11.5–15.5)
IANC: 5.19 K/UL — SIGNIFICANT CHANGE UP (ref 1.8–7.4)
IMM GRANULOCYTES NFR BLD AUTO: 0.9 % — SIGNIFICANT CHANGE UP (ref 0–0.9)
LYMPHOCYTES # BLD AUTO: 2.26 K/UL — SIGNIFICANT CHANGE UP (ref 1–3.3)
LYMPHOCYTES # BLD AUTO: 27.5 % — SIGNIFICANT CHANGE UP (ref 13–44)
MAGNESIUM SERPL-MCNC: 1.8 MG/DL — SIGNIFICANT CHANGE UP (ref 1.6–2.6)
MCHC RBC-ENTMCNC: 28 PG — SIGNIFICANT CHANGE UP (ref 27–34)
MCHC RBC-ENTMCNC: 32.8 GM/DL — SIGNIFICANT CHANGE UP (ref 32–36)
MCV RBC AUTO: 85.3 FL — SIGNIFICANT CHANGE UP (ref 80–100)
MONOCYTES # BLD AUTO: 0.53 K/UL — SIGNIFICANT CHANGE UP (ref 0–0.9)
MONOCYTES NFR BLD AUTO: 6.4 % — SIGNIFICANT CHANGE UP (ref 2–14)
NEUTROPHILS # BLD AUTO: 5.19 K/UL — SIGNIFICANT CHANGE UP (ref 1.8–7.4)
NEUTROPHILS NFR BLD AUTO: 63 % — SIGNIFICANT CHANGE UP (ref 43–77)
NRBC # BLD: 0 /100 WBCS — SIGNIFICANT CHANGE UP (ref 0–0)
NRBC # FLD: 0 K/UL — SIGNIFICANT CHANGE UP (ref 0–0)
PHOSPHATE SERPL-MCNC: 4 MG/DL — SIGNIFICANT CHANGE UP (ref 2.5–4.5)
PLATELET # BLD AUTO: 282 K/UL — SIGNIFICANT CHANGE UP (ref 150–400)
POTASSIUM SERPL-MCNC: 3.9 MMOL/L — SIGNIFICANT CHANGE UP (ref 3.5–5.3)
POTASSIUM SERPL-SCNC: 3.9 MMOL/L — SIGNIFICANT CHANGE UP (ref 3.5–5.3)
PROT SERPL-MCNC: 6.5 G/DL — SIGNIFICANT CHANGE UP (ref 6–8.3)
RBC # BLD: 4.29 M/UL — SIGNIFICANT CHANGE UP (ref 3.8–5.2)
RBC # FLD: 17.9 % — HIGH (ref 10.3–14.5)
SODIUM SERPL-SCNC: 137 MMOL/L — SIGNIFICANT CHANGE UP (ref 135–145)
WBC # BLD: 8.23 K/UL — SIGNIFICANT CHANGE UP (ref 3.8–10.5)
WBC # FLD AUTO: 8.23 K/UL — SIGNIFICANT CHANGE UP (ref 3.8–10.5)

## 2023-10-10 PROCEDURE — 99239 HOSP IP/OBS DSCHRG MGMT >30: CPT

## 2023-10-10 RX ORDER — ATORVASTATIN CALCIUM 80 MG/1
1 TABLET, FILM COATED ORAL
Qty: 30 | Refills: 0
Start: 2023-10-10 | End: 2023-11-08

## 2023-10-10 RX ORDER — ASPIRIN/CALCIUM CARB/MAGNESIUM 324 MG
1 TABLET ORAL
Qty: 30 | Refills: 0
Start: 2023-10-10 | End: 2023-11-08

## 2023-10-10 RX ORDER — ASPIRIN/CALCIUM CARB/MAGNESIUM 324 MG
81 TABLET ORAL DAILY
Refills: 0 | Status: ACTIVE | OUTPATIENT
Start: 2023-10-10 | End: 2024-09-07

## 2023-10-10 RX ADMIN — PANTOPRAZOLE SODIUM 40 MILLIGRAM(S): 20 TABLET, DELAYED RELEASE ORAL at 05:19

## 2023-10-10 RX ADMIN — Medication 20 MILLIGRAM(S): at 05:16

## 2023-10-10 RX ADMIN — BUDESONIDE AND FORMOTEROL FUMARATE DIHYDRATE 2 PUFF(S): 160; 4.5 AEROSOL RESPIRATORY (INHALATION) at 09:32

## 2023-10-10 RX ADMIN — LAMOTRIGINE 150 MILLIGRAM(S): 25 TABLET, ORALLY DISINTEGRATING ORAL at 12:00

## 2023-10-10 RX ADMIN — Medication 1 TABLET(S): at 05:15

## 2023-10-10 RX ADMIN — Medication 81 MILLIGRAM(S): at 11:59

## 2023-10-10 RX ADMIN — Medication 1000 UNIT(S): at 11:59

## 2023-10-10 RX ADMIN — Medication 25 MICROGRAM(S): at 05:15

## 2023-10-10 RX ADMIN — Medication 1 TABLET(S): at 12:00

## 2023-10-10 RX ADMIN — Medication 20 MILLIGRAM(S): at 11:59

## 2023-10-10 RX ADMIN — PREGABALIN 1000 MICROGRAM(S): 225 CAPSULE ORAL at 11:59

## 2023-10-10 NOTE — PROGRESS NOTE ADULT - ASSESSMENT
58y (1965) right handed woman with a PMHx significant for COPD, Meniere's disease s/p R labyrinthectomy, bipolar disorder who presents as code stroke for acute onset dizziness, difficulty ambulating, s/p tenecteplase administration in ED. CTH negative. CTA H/N w/ IV contrast with ulcerated L ICA plaque. TTE grossly negative. MRI brain w/o contrast negative for stroke.    Impression: Improving R hemiparesis of unclear etiology at this time. Presumably can be due to L brain dysfunction from tenecteplase aborted stroke of unknown mechanism, but this is uncertain given that the patient has persisting deficits. Out of abundance of caution, should be treated with secondary prevention for ischemic stroke.     Recommendations     [] Consider outpatient serial Carotid Dopplers of the L ICA q6months  [] C/w ASA 81 mg indefinitely  [] Would discontinue Plavix 75 mg.   [] Atorvastatin 80 mg qhs, titrate to LDL < 70 (currently 132)   [] DVT prophylaxis  [] Neurochecks: q8hr   [] BP goals: Normotension, avoid hypotension. Long term goals <130/80 mmHg.   [] PT/OT: inpatient rehab   [] Diet: DASH diet as tolerated   [] HgA1C goals < 7.0 (currently 5.9)   [] Lifestyle modifications: smoking cessation, exercise, and a Mediterranean style diet.   [] Upon discharge, patient should follow up with Dr. Rodriguez:  [address] [place] [address]. Call [phone].     From neurovascular standpoint, no further inpatient workup required at this time.     Patient case discussed and seen with stroke attending, Dr. Rodriguez.    Please call with questions: [phone]

## 2023-10-10 NOTE — DISCHARGE NOTE PROVIDER - CARE PROVIDER_API CALL
Andrés Rodriguez  Neurology  [address]  [place] [address]  Phone: [phone]  Fax: [phone]  Follow Up Time:

## 2023-10-10 NOTE — DISCHARGE NOTE NURSING/CASE MANAGEMENT/SOCIAL WORK - PATIENT PORTAL LINK FT
You can access the FollowMyHealth Patient Portal offered by [facility] by registering at the following website: [email or web address]. By joining [facility]’s FollowMyHealth portal, you will also be able to view your health information using other applications (apps) compatible with our system.

## 2023-10-10 NOTE — PROGRESS NOTE ADULT - TIME BILLING
Medical management as above, reviewing chart and coordinating care with primary team/staff, as well as reviewing vitals, radiology, medication list, recent labs, and prior records.    Does not include teaching time.
-review of the history and records  -general and neurological examination  -generation of assessment and treatment plan  -communication with the patient/family members  -coordination of care.
Medical management as above, reviewing chart and coordinating care with primary team/staff, as well as reviewing vitals, radiology, medication list, recent labs, and prior records.    Does not include teaching time.

## 2023-10-10 NOTE — PROGRESS NOTE ADULT - SUBJECTIVE AND OBJECTIVE BOX
INTERVAL HISTORY: Patient seen at bedside by stroke team & attending. No acute events overnight. Patient reports that her R sided weakness improving but still remains. Discussed MRI results and management moving forward.     PAST MEDICAL & SURGICAL HISTORY:  COPD exacerbation      History of Meniere's disease      Bipolar disorder        FAMILY HISTORY:    SOCIAL HISTORY:   T/E/D:   Occupation:   Lives with:     MEDICATIONS (HOME):  Home Medications:  Abilify 20 mg oral tablet: 1 tab(s) orally once a day (at bedtime) (03 Oct 2023 23:36)  Albuterol (Eqv-Proventil HFA) 90 mcg/inh inhalation aerosol: 2 puff(s) inhaled every 6 hours as needed for  shortness of breath and/or wheezing (03 Oct 2023 23:36)  amitriptyline 25 mg oral tablet: 1 tab(s) orally once a day (03 Oct 2023 23:36)  Anoro Ellipta 62.5 mcg-25 mcg/inh inhalation powder: 1 puff(s) inhaled once a day (03 Oct 2023 23:36)  benzonatate 100 mg oral capsule: 1 cap(s) orally 3 times a day as needed for  cough (03 Oct 2023 23:36)  cholecalciferol 25 mcg (1000 intl units) oral capsule: 1 cap(s) orally once a day (03 Oct 2023 23:36)  cyanocobalamin 1000 mcg oral tablet: 1 tab(s) orally once a day (03 Oct 2023 23:36)  FLUoxetine 10 mg oral capsule: 1 cap(s) orally once a day (03 Oct 2023 23:36)  ipratropium-albuterol 0.5 mg-2.5 mg/3 mL inhalation solution: 3 milliliter(s) by nebulizer every 4 hours as needed for  shortness of breath and/or wheezing (03 Oct 2023 23:36)  lamoTRIgine 150 mg oral tablet: 1 tab(s) orally once a day (03 Oct 2023 23:36)  Lasix 20 mg oral tablet: 1 tab(s) orally once a day (03 Oct 2023 23:36)  levothyroxine 25 mcg (0.025 mg) oral tablet: 1 tab(s) orally once a day (03 Oct 2023 23:36)  Multiple Vitamins oral tablet: 1 tab(s) orally (03 Oct 2023 23:37)  Protonix 40 mg oral delayed release tablet: 1 tab(s) orally once a day (03 Oct 2023 23:36)  Ritalin 20 mg oral tablet: 1 tab(s) orally once a day (03 Oct 2023 23:26)  triamterene-hydrochlorothiazide 37.5 mg-25 mg oral tablet: 1 tab(s) orally once a day (03 Oct 2023 23:36)    MEDICATIONS  (STANDING):  amitriptyline 25 milliGRAM(s) Oral daily  ARIPiprazole 20 milliGRAM(s) Oral daily  aspirin  chewable 81 milliGRAM(s) Oral daily  atorvastatin 40 milliGRAM(s) Oral at bedtime  budesonide  80 MICROgram(s)/formoterol 4.5 MICROgram(s) Inhaler 2 Puff(s) Inhalation two times a day  cholecalciferol 1000 Unit(s) Oral daily  clopidogrel Tablet 75 milliGRAM(s) Oral daily  cyanocobalamin 1000 MICROGram(s) Oral daily  FLUoxetine 10 milliGRAM(s) Oral daily  furosemide    Tablet 20 milliGRAM(s) Oral daily  influenza   Vaccine 0.5 milliLiter(s) IntraMuscular once  lamoTRIgine 150 milliGRAM(s) Oral daily  levothyroxine 25 MICROGram(s) Oral daily  methylphenidate 20 milliGRAM(s) Oral daily  multivitamin 1 Tablet(s) Oral daily  pantoprazole    Tablet 40 milliGRAM(s) Oral before breakfast  triamterene 37.5 mG/hydrochlorothiazide 25 mG Tablet 1 Tablet(s) Oral daily    MEDICATIONS  (PRN):  albuterol/ipratropium for Nebulization 3 milliLiter(s) Nebulizer every 6 hours PRN Shortness of Breath and/or Wheezing  melatonin 3 milliGRAM(s) Oral at bedtime PRN Insomnia    ALLERGIES/INTOLERANCES:  Allergies  No Known Allergies    Intolerances    VITALS & EXAMINATION:  Vital Signs Last 24 Hrs  T(C): 36.5 (10 Oct 2023 05:16), Max: 36.7 (09 Oct 2023 17:00)  T(F): 97.7 (10 Oct 2023 05:16), Max: 98.1 (09 Oct 2023 17:00)  HR: 70 (10 Oct 2023 05:16) (70 - 88)  BP: 118/68 (10 Oct 2023 05:16) (110/79 - 135/69)  BP(mean): --  RR: 17 (10 Oct 2023 05:16) (17 - 18)  SpO2: 97% (10 Oct 2023 05:16) (96% - 97%)    Parameters below as of 10 Oct 2023 05:16  Patient On (Oxygen Delivery Method): room air        General:  Constitutional: Female, appears stated age, in no apparent distress including pain  Head: Normocephalic & Atraumatic.  Respiratory: Breathing comfortably.  Extremities: No cyanosis, clubbing, or edema    Neurological:  MS: Eyes closed, open to voice. Awake, alert, oriented to person, place, situation, time. Follows all commands.    Language: Speech is clear, fluent with good repetition & comprehension.    CNs: VFF. EOMI no nystagmus. V1-3 intact to LT b/l. R nasolabial fold flattening, corrects with movements.     Motor: Normal muscle bulk & tone. No noticeable tremor. Drift of RUE/RLE>     Sensation: Intact to LT b/l throughout.     Cortical: Extinction on DSS (neglect): none    Coordination: No dysmetria to FTN b/l.     Gait: Deferred.       LABORATORY:  CBC                       12.0   8.23  )-----------( 282      ( 10 Oct 2023 05:36 )             36.6     Chem 10-10    137  |  99  |  12  ----------------------------<  112<H>  3.9   |  25  |  0.75    Ca    9.4      10 Oct 2023 05:36  Phos  4.0     10-10  Mg     1.80     10-10    TPro  6.5  /  Alb  3.9  /  TBili  0.3  /  DBili  x   /  AST  25  /  ALT  34<H>  /  AlkPhos  112  10-10    LFTs LIVER FUNCTIONS - ( 10 Oct 2023 05:36 )  Alb: 3.9 g/dL / Pro: 6.5 g/dL / ALK PHOS: 112 U/L / ALT: 34 U/L / AST: 25 U/L / GGT: x           Coagulopathy   Lipid Panel 10-05 Chol 205<H> LDL -- HDL 33<L> Trig 198<H>, 10-03 Chol 224<H> LDL -- HDL 36<L> Trig 246<H>  A1c   Cardiac enzymes     U/A Urinalysis Basic - ( 10 Oct 2023 05:36 )    Color: x / Appearance: x / SG: x / pH: x  Gluc: 112 mg/dL / Ketone: x  / Bili: x / Urobili: x   Blood: x / Protein: x / Nitrite: x   Leuk Esterase: x / RBC: x / WBC x   Sq Epi: x / Non Sq Epi: x / Bacteria: x      CSF  Immunological  Other    STUDIES & IMAGING:  Studies (EKG, EEG, EMG, etc):     Radiology (XR, CT, MR, U/S, TTE/BETTY):    MRI brain w/o contrast:     Comparison is made with the prior brain CT 10/6/2023.    The fourth, third and lateral ventricles are normal in size and position.   There is no hemorrhage, mass or shift of the midline structures. No   ventricular and sulcal prominence is consistent with age-appropriate   involutional change. Small vesselwhite matter ischemic changes are   noted. No acute infarcts are seen. There is no old or new hemorrhage. The   sellar and parasellar structures are unremarkable. The paranasal sinuses   are clear.      IMPRESSION: Age-appropriate involutional and ischemic gliotic changes. No   acute infarcts, hemorrhage or mass.    --- End of Report ---      TTE 10/4:      1. Technically difficult image quality.   2. Technically very difficult study.   3. Left ventricular systolic function is normal with an ejection fraction of 66 % by Salazar's method of disks.   4. The right ventricle is not well visualized.          < from: CT Brain Stroke Protocol (10.03.23 @ 21:14) >  FINDINGS:    BRAIN:No apparent acute infarct, hemorrhage or mass. No hydrocephalus.   Chronic appearing white matter hypodensities and volume loss.    CALVARIUM: No skull fracture or concerning osseous lesions.    EXTRACRANIAL SOFT TISSUES: No acute findings.    VISUALIZED PORTIONS OF THE PARANASAL SINUSES AND MASTOID AIR CELLS: No   air fluid levels. Status post right canal wall up mastoidectomy.    IMPRESSION:  No acute intracranial findings.    < from: CT Brain Perfusion Maps Stroke (10.03.23 @ 21:15) >  FINDINGS:    CT PERFUSION BRAIN:  Perfusion maps symmetric with no evidence of territorial infarct or   ischemia.    CBF < 30%: 0  TMax > 6s: 0  Mismatch volume: 0  Mismatch ratio: 0    CTA HEAD/NECK:    AORTIC ARCH: Left-sided aortic arch. Mild atherosclerosis of the arch and   great vessel origins with no flow-limiting stenosis.    RIGHT ANTERIOR CIRCULATION:  CCA: Normal course and caliber. No dissection.  ICA: Mild atherosclerosis. No flow-limiting stenosis. No dissection. No   anuerysm.  MCA: Normal course and caliber. No anuerysm.  NAT: Normal course and caliber. No anuerysm.    LEFT ANTERIOR CIRCULATION:  CCA: Mild atherosclerosis. No flow-limiting stenosis. No dissection.  ICA: Small ulcerated plaque posterior aspect of the origin of the left   ICA. Otherwise mild atherosclerosis. No flow-limiting stenosis. No   dissection. No anuerysm.  MCA: Normal course and caliber. No anuerysm.  NAT: Normal course and caliber. No anuerysm.    POSTERIOR CIRCULATION:  VERTEBRALS: Normal course and caliber. No dissection. Codominant.   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  BASILAR: Normal course and caliber. No dissection. No anuerysm.  PCA: Normal course and caliber. No dissection. No anuerysm.    VEINS: No intracranial DVT.    OTHERS:  Status post right canal wall up mastoidectomy.  Mild subpleural emphysema partially visible in the mid and upper lungs.  Degenerative changes lower cervical spine, no evidence ofhigh-grade   spinal canal narrowing.  Surgical anchors left bony glenoid.    IMPRESSION:    CT PERFUSION BRAIN:  No evidence of territorial infarct or ischemia.    CTA HEAD:  No flow-limiting stenosis, occlusion or aneurysm.    CTA NECK:  No flow-limiting stenosis, occlusion or dissection.  Small ulcerated plaque posterior aspect of the origin of the left ICA   with no significant luminal narrowing.

## 2023-10-10 NOTE — DISCHARGE NOTE PROVIDER - HOSPITAL COURSE
Patient is a 57yo F with PMH of COPD, Meniere’s disease s/p R labyrinthectomy, and bipolar disorder who presented with acute onset dizziness, blurry vision, and difficulty ambulating due to right-sided weakness. She was given Tenecteplase for suspected stroke and kept in MICU for monitoring, MRI neg acute CVA suggesting a TIA. No events on Tele, TTE with no acute findings. PT reccomended outpt PT     COPD without exacerbation.   c/w inhaler    History of Meniere's disease.   ·  Plan: continue home antihistamine PRN.    #Bipolar disorder/other psychiatric illnesses  - continue home Abilify, Prozac, methylphenidate, lamotrigine, and amitryptiline.

## 2023-10-10 NOTE — DISCHARGE NOTE NURSING/CASE MANAGEMENT/SOCIAL WORK - NSDCPEFALRISK_GEN_ALL_CORE
For information on Fall & Injury Prevention, visit: [email or web address] OR  [email or web address] OR  https://www.cdc.gov/steadi/patient.html

## 2023-10-10 NOTE — DISCHARGE NOTE PROVIDER - NSDCCPCAREPLAN_GEN_ALL_CORE_FT
PRINCIPAL DISCHARGE DIAGNOSIS  Diagnosis: TIA (transient ischemic attack)  Assessment and Plan of Treatment: your weakness is likely due to TIA which improved after medication. Please follow up with neurology upon discharge      SECONDARY DISCHARGE DIAGNOSES  Diagnosis: COPD without exacerbation  Assessment and Plan of Treatment: c/w inhalers    Diagnosis: Psychiatric illness  Assessment and Plan of Treatment: c/w psychiatric medications

## 2023-10-10 NOTE — PROGRESS NOTE ADULT - REASON FOR ADMISSION
Dizziness, Difficulty ambulating, Blurry vision

## 2023-10-10 NOTE — DISCHARGE NOTE PROVIDER - NSDCFUADDAPPT_GEN_ALL_CORE_FT
Please follow up with Neurology Dr. Kemp outpatient serial Carotid Dopplers of the L ICA  every 6months within 2 weeks of discharge.    Follow up with Primary Doctor within 2 weeks of discharge.

## 2023-10-10 NOTE — DISCHARGE NOTE PROVIDER - NSDCMRMEDTOKEN_GEN_ALL_CORE_FT
Abilify 20 mg oral tablet: 1 tab(s) orally once a day (at bedtime)  Albuterol (Eqv-Proventil HFA) 90 mcg/inh inhalation aerosol: 2 puff(s) inhaled every 6 hours as needed for  shortness of breath and/or wheezing  amitriptyline 25 mg oral tablet: 1 tab(s) orally once a day  Anoro Ellipta 62.5 mcg-25 mcg/inh inhalation powder: 1 puff(s) inhaled once a day  benzonatate 100 mg oral capsule: 1 cap(s) orally 3 times a day as needed for  cough  cholecalciferol 25 mcg (1000 intl units) oral capsule: 1 cap(s) orally once a day  cyanocobalamin 1000 mcg oral tablet: 1 tab(s) orally once a day  FLUoxetine 10 mg oral capsule: 1 cap(s) orally once a day  ipratropium-albuterol 0.5 mg-2.5 mg/3 mL inhalation solution: 3 milliliter(s) by nebulizer every 4 hours as needed for  shortness of breath and/or wheezing  lamoTRIgine 150 mg oral tablet: 1 tab(s) orally once a day  Lasix 20 mg oral tablet: 1 tab(s) orally once a day  levothyroxine 25 mcg (0.025 mg) oral tablet: 1 tab(s) orally once a day  Multiple Vitamins oral tablet: 1 tab(s) orally  Protonix 40 mg oral delayed release tablet: 1 tab(s) orally once a day  Ritalin 20 mg oral tablet: 1 tab(s) orally once a day  triamterene-hydrochlorothiazide 37.5 mg-25 mg oral tablet: 1 tab(s) orally once a day   Abilify 20 mg oral tablet: 1 tab(s) orally once a day (at bedtime)  Albuterol (Eqv-Proventil HFA) 90 mcg/inh inhalation aerosol: 2 puff(s) inhaled every 6 hours as needed for  shortness of breath and/or wheezing  amitriptyline 25 mg oral tablet: 1 tab(s) orally once a day  Anoro Ellipta 62.5 mcg-25 mcg/inh inhalation powder: 1 puff(s) inhaled once a day  aspirin 81 mg oral delayed release tablet: 1 tab(s) orally once a day  atorvastatin 40 mg oral tablet: 1 tab(s) orally once a day (at bedtime)  benzonatate 100 mg oral capsule: 1 cap(s) orally 3 times a day as needed for  cough  cholecalciferol 25 mcg (1000 intl units) oral capsule: 1 cap(s) orally once a day  cyanocobalamin 1000 mcg oral tablet: 1 tab(s) orally once a day  FLUoxetine 10 mg oral capsule: 1 cap(s) orally once a day  ipratropium-albuterol 0.5 mg-2.5 mg/3 mL inhalation solution: 3 milliliter(s) by nebulizer every 4 hours as needed for  shortness of breath and/or wheezing  lamoTRIgine 150 mg oral tablet: 1 tab(s) orally once a day  Lasix 20 mg oral tablet: 1 tab(s) orally once a day  levothyroxine 25 mcg (0.025 mg) oral tablet: 1 tab(s) orally once a day  Multiple Vitamins oral tablet: 1 tab(s) orally once a day  PHYSICAL THERAPY: Please kindly evaluate for Outpatient Physical Therapy sessions. Recommend 3x weekly for 90 days.  Protonix 40 mg oral delayed release tablet: 1 tab(s) orally once a day  Ritalin 20 mg oral tablet: 1 tab(s) orally once a day  triamterene-hydrochlorothiazide 37.5 mg-25 mg oral tablet: 1 tab(s) orally once a day

## 2024-03-02 NOTE — ED ADULT NURSE NOTE - OBJECTIVE STATEMENT
Shante RN- Received Pt in CT as a code stroke. pt with hx of COPD, Meniere's disease, bipolar, lap band & multiple abdominal surgeries. pt A&OX4. c/o dizziness, blurry vision, headache states feels "fogy" beginning around 6pm. reports was recently admitted to the hospital for COPD and gained 10lbs in one day. pt appears dizzy, denies any numbness/ weakness/ tingling to extremities. no slurred speech, facial droop noted. no chest pain, sob, fever, n/v/d. neuro exam as noted. respirations are equal and nonlabored, no respiratory distress noted. 20 gauge IV placed in the right ac, labs sent. pt stable, will endorse report to primary RN Helen for further plan. 56.7

## 2024-05-14 ENCOUNTER — INPATIENT (INPATIENT)
Facility: HOSPITAL | Age: 59
LOS: 0 days | Discharge: HOME | End: 2024-05-15
Attending: STUDENT IN AN ORGANIZED HEALTH CARE EDUCATION/TRAINING PROGRAM | Admitting: STUDENT IN AN ORGANIZED HEALTH CARE EDUCATION/TRAINING PROGRAM
Payer: MEDICARE

## 2024-05-14 VITALS
OXYGEN SATURATION: 100 % | HEART RATE: 71 BPM | RESPIRATION RATE: 18 BRPM | SYSTOLIC BLOOD PRESSURE: 124 MMHG | DIASTOLIC BLOOD PRESSURE: 77 MMHG | TEMPERATURE: 98 F

## 2024-05-14 DIAGNOSIS — J44.9 CHRONIC OBSTRUCTIVE PULMONARY DISEASE, UNSPECIFIED: ICD-10-CM

## 2024-05-14 DIAGNOSIS — R53.1 WEAKNESS: ICD-10-CM

## 2024-05-14 DIAGNOSIS — Z79.899 OTHER LONG TERM (CURRENT) DRUG THERAPY: ICD-10-CM
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
## 2024-05-14 DIAGNOSIS — F31.9 BIPOLAR DISORDER, UNSPECIFIED: ICD-10-CM

## 2024-05-14 DIAGNOSIS — Z29.9 ENCOUNTER FOR PROPHYLACTIC MEASURES, UNSPECIFIED: ICD-10-CM

## 2024-05-14 DIAGNOSIS — Z98.890 OTHER SPECIFIED POSTPROCEDURAL STATES: Chronic | ICD-10-CM

## 2024-05-14 PROBLEM — Z86.69 PERSONAL HISTORY OF OTHER DISEASES OF THE NERVOUS SYSTEM AND SENSE ORGANS: Chronic | Status: ACTIVE | Noted: 2023-10-03

## 2024-05-14 LAB
ALBUMIN SERPL ELPH-MCNC: 3.9 G/DL — SIGNIFICANT CHANGE UP (ref 3.3–5)
ALP SERPL-CCNC: 158 U/L — HIGH (ref 40–120)
ALT FLD-CCNC: 29 U/L — SIGNIFICANT CHANGE UP (ref 4–33)
ANION GAP SERPL CALC-SCNC: 14 MMOL/L — SIGNIFICANT CHANGE UP (ref 7–14)
APTT BLD: 35.5 SEC — SIGNIFICANT CHANGE UP (ref 24.5–35.6)
AST SERPL-CCNC: 46 U/L — HIGH (ref 4–32)
BASOPHILS # BLD AUTO: 0.02 K/UL — SIGNIFICANT CHANGE UP (ref 0–0.2)
BASOPHILS NFR BLD AUTO: 0.3 % — SIGNIFICANT CHANGE UP (ref 0–2)
BILIRUB SERPL-MCNC: 0.4 MG/DL — SIGNIFICANT CHANGE UP (ref 0.2–1.2)
BUN SERPL-MCNC: 9 MG/DL — SIGNIFICANT CHANGE UP (ref 7–23)
CALCIUM SERPL-MCNC: 9 MG/DL — SIGNIFICANT CHANGE UP (ref 8.4–10.5)
CHLORIDE SERPL-SCNC: 94 MMOL/L — LOW (ref 98–107)
CO2 SERPL-SCNC: 24 MMOL/L — SIGNIFICANT CHANGE UP (ref 22–31)
CREAT SERPL-MCNC: 0.69 MG/DL — SIGNIFICANT CHANGE UP (ref 0.5–1.3)
EGFR: 100 ML/MIN/1.73M2 — SIGNIFICANT CHANGE UP
EOSINOPHIL # BLD AUTO: 0.01 K/UL — SIGNIFICANT CHANGE UP (ref 0–0.5)
EOSINOPHIL NFR BLD AUTO: 0.1 % — SIGNIFICANT CHANGE UP (ref 0–6)
GLUCOSE BLDC GLUCOMTR-MCNC: 125 MG/DL — HIGH (ref 70–99)
GLUCOSE SERPL-MCNC: 137 MG/DL — HIGH (ref 70–99)
HCT VFR BLD CALC: 45.4 % — HIGH (ref 34.5–45)
HGB BLD-MCNC: 15.5 G/DL — SIGNIFICANT CHANGE UP (ref 11.5–15.5)
IANC: 4.54 K/UL — SIGNIFICANT CHANGE UP (ref 1.8–7.4)
IMM GRANULOCYTES NFR BLD AUTO: 0.3 % — SIGNIFICANT CHANGE UP (ref 0–0.9)
INR BLD: 0.93 RATIO — SIGNIFICANT CHANGE UP (ref 0.85–1.18)
LYMPHOCYTES # BLD AUTO: 2.61 K/UL — SIGNIFICANT CHANGE UP (ref 1–3.3)
LYMPHOCYTES # BLD AUTO: 34.1 % — SIGNIFICANT CHANGE UP (ref 13–44)
MCHC RBC-ENTMCNC: 28.4 PG — SIGNIFICANT CHANGE UP (ref 27–34)
MCHC RBC-ENTMCNC: 34.1 GM/DL — SIGNIFICANT CHANGE UP (ref 32–36)
MCV RBC AUTO: 83.2 FL — SIGNIFICANT CHANGE UP (ref 80–100)
MONOCYTES # BLD AUTO: 0.45 K/UL — SIGNIFICANT CHANGE UP (ref 0–0.9)
MONOCYTES NFR BLD AUTO: 5.9 % — SIGNIFICANT CHANGE UP (ref 2–14)
NEUTROPHILS # BLD AUTO: 4.54 K/UL — SIGNIFICANT CHANGE UP (ref 1.8–7.4)
NEUTROPHILS NFR BLD AUTO: 59.3 % — SIGNIFICANT CHANGE UP (ref 43–77)
NRBC # BLD: 0 /100 WBCS — SIGNIFICANT CHANGE UP (ref 0–0)
NRBC # FLD: 0 K/UL — SIGNIFICANT CHANGE UP (ref 0–0)
PLATELET # BLD AUTO: 255 K/UL — SIGNIFICANT CHANGE UP (ref 150–400)
POTASSIUM SERPL-MCNC: 4.2 MMOL/L — SIGNIFICANT CHANGE UP (ref 3.5–5.3)
POTASSIUM SERPL-SCNC: 4.2 MMOL/L — SIGNIFICANT CHANGE UP (ref 3.5–5.3)
PROT SERPL-MCNC: 7 G/DL — SIGNIFICANT CHANGE UP (ref 6–8.3)
PROTHROM AB SERPL-ACNC: 10.5 SEC — SIGNIFICANT CHANGE UP (ref 9.5–13)
RBC # BLD: 5.46 M/UL — HIGH (ref 3.8–5.2)
RBC # FLD: 18 % — HIGH (ref 10.3–14.5)
SODIUM SERPL-SCNC: 132 MMOL/L — LOW (ref 135–145)
TROPONIN T, HIGH SENSITIVITY RESULT: 7 NG/L — SIGNIFICANT CHANGE UP
WBC # BLD: 7.65 K/UL — SIGNIFICANT CHANGE UP (ref 3.8–10.5)
WBC # FLD AUTO: 7.65 K/UL — SIGNIFICANT CHANGE UP (ref 3.8–10.5)

## 2024-05-14 PROCEDURE — 99291 CRITICAL CARE FIRST HOUR: CPT

## 2024-05-14 PROCEDURE — 70450 CT HEAD/BRAIN W/O DYE: CPT | Mod: 26,59,MC

## 2024-05-14 PROCEDURE — 70496 CT ANGIOGRAPHY HEAD: CPT | Mod: 26,MC

## 2024-05-14 PROCEDURE — 72126 CT NECK SPINE W/DYE: CPT | Mod: 26,MC

## 2024-05-14 PROCEDURE — 70498 CT ANGIOGRAPHY NECK: CPT | Mod: 26,MC

## 2024-05-14 PROCEDURE — 0042T: CPT | Mod: MC

## 2024-05-14 PROCEDURE — 99223 1ST HOSP IP/OBS HIGH 75: CPT

## 2024-05-14 RX ORDER — IPRATROPIUM/ALBUTEROL SULFATE 18-103MCG
3 AEROSOL WITH ADAPTER (GRAM) INHALATION
Refills: 0 | DISCHARGE

## 2024-05-14 RX ORDER — PANTOPRAZOLE SODIUM 20 MG/1
1 TABLET, DELAYED RELEASE ORAL
Refills: 0 | DISCHARGE

## 2024-05-14 RX ORDER — CHOLECALCIFEROL (VITAMIN D3) 125 MCG
1 CAPSULE ORAL
Refills: 0 | DISCHARGE

## 2024-05-14 RX ORDER — ALBUTEROL 90 UG/1
2 AEROSOL, METERED ORAL
Refills: 0 | DISCHARGE

## 2024-05-14 RX ORDER — KETOROLAC TROMETHAMINE 30 MG/ML
15 SYRINGE (ML) INJECTION ONCE
Refills: 0 | Status: DISCONTINUED | OUTPATIENT
Start: 2024-05-14 | End: 2024-05-14

## 2024-05-14 RX ORDER — IPRATROPIUM/ALBUTEROL SULFATE 18-103MCG
3 AEROSOL WITH ADAPTER (GRAM) INHALATION EVERY 6 HOURS
Refills: 0 | Status: DISCONTINUED | OUTPATIENT
Start: 2024-05-14 | End: 2024-05-15

## 2024-05-14 RX ORDER — METOCLOPRAMIDE HCL 10 MG
10 TABLET ORAL ONCE
Refills: 0 | Status: COMPLETED | OUTPATIENT
Start: 2024-05-14 | End: 2024-05-14

## 2024-05-14 RX ORDER — LANOLIN ALCOHOL/MO/W.PET/CERES
3 CREAM (GRAM) TOPICAL AT BEDTIME
Refills: 0 | Status: DISCONTINUED | OUTPATIENT
Start: 2024-05-14 | End: 2024-05-15

## 2024-05-14 RX ORDER — ACETAMINOPHEN 500 MG
1000 TABLET ORAL ONCE
Refills: 0 | Status: COMPLETED | OUTPATIENT
Start: 2024-05-14 | End: 2024-05-14

## 2024-05-14 RX ORDER — ONDANSETRON 8 MG/1
4 TABLET, FILM COATED ORAL EVERY 8 HOURS
Refills: 0 | Status: DISCONTINUED | OUTPATIENT
Start: 2024-05-14 | End: 2024-05-15

## 2024-05-14 RX ORDER — FLUOXETINE HCL 10 MG
1 CAPSULE ORAL
Refills: 0 | DISCHARGE

## 2024-05-14 RX ORDER — LEVOTHYROXINE SODIUM 125 MCG
25 TABLET ORAL DAILY
Refills: 0 | Status: DISCONTINUED | OUTPATIENT
Start: 2024-05-14 | End: 2024-05-15

## 2024-05-14 RX ORDER — PREGABALIN 225 MG/1
1 CAPSULE ORAL
Refills: 0 | DISCHARGE

## 2024-05-14 RX ORDER — ACETAMINOPHEN 500 MG
650 TABLET ORAL EVERY 6 HOURS
Refills: 0 | Status: DISCONTINUED | OUTPATIENT
Start: 2024-05-14 | End: 2024-05-15

## 2024-05-14 RX ORDER — TIOTROPIUM BROMIDE AND OLODATEROL 3.124; 2.736 UG/1; UG/1
2 SPRAY, METERED RESPIRATORY (INHALATION) DAILY
Refills: 0 | Status: DISCONTINUED | OUTPATIENT
Start: 2024-05-14 | End: 2024-05-15

## 2024-05-14 RX ORDER — ENOXAPARIN SODIUM 100 MG/ML
40 INJECTION SUBCUTANEOUS EVERY 24 HOURS
Refills: 0 | Status: DISCONTINUED | OUTPATIENT
Start: 2024-05-14 | End: 2024-05-15

## 2024-05-14 RX ORDER — ARIPIPRAZOLE 15 MG/1
20 TABLET ORAL AT BEDTIME
Refills: 0 | Status: DISCONTINUED | OUTPATIENT
Start: 2024-05-14 | End: 2024-05-15

## 2024-05-14 RX ORDER — AMITRIPTYLINE HCL 25 MG
1 TABLET ORAL
Refills: 0 | DISCHARGE

## 2024-05-14 RX ORDER — LAMOTRIGINE 25 MG/1
150 TABLET, ORALLY DISINTEGRATING ORAL
Refills: 0 | Status: DISCONTINUED | OUTPATIENT
Start: 2024-05-14 | End: 2024-05-15

## 2024-05-14 RX ORDER — FLUOXETINE HCL 10 MG
40 CAPSULE ORAL DAILY
Refills: 0 | Status: DISCONTINUED | OUTPATIENT
Start: 2024-05-14 | End: 2024-05-15

## 2024-05-14 RX ADMIN — ARIPIPRAZOLE 20 MILLIGRAM(S): 15 TABLET ORAL at 22:29

## 2024-05-14 RX ADMIN — Medication 15 MILLIGRAM(S): at 17:43

## 2024-05-14 RX ADMIN — Medication 400 MILLIGRAM(S): at 14:34

## 2024-05-14 RX ADMIN — Medication 15 MILLIGRAM(S): at 18:32

## 2024-05-14 RX ADMIN — Medication 1000 MILLIGRAM(S): at 15:23

## 2024-05-14 RX ADMIN — Medication 1000 MILLIGRAM(S): at 15:04

## 2024-05-14 RX ADMIN — Medication 10 MILLIGRAM(S): at 14:10

## 2024-05-14 RX ADMIN — LAMOTRIGINE 150 MILLIGRAM(S): 25 TABLET, ORALLY DISINTEGRATING ORAL at 22:30

## 2024-05-14 NOTE — CONSULT NOTE ADULT - ASSESSMENT
59y (1965)  right handed woman with a PMHx significant for COPD, Meniere's disease s/p R labyrinthectomy, bipolar disorder who presents as code stroke for acute onset dizziness, difficulty ambulating. She describes her dizziness as spinning. He exam is revealing of giveaway weakness due to pain in her arm and neck on the right side. CT CTA CTP is negative so far. She had a past episode with a negative MRI brain.     impression: patient with Meniere's disease presenting for an episode of vertigo likely Meniere attack however she would benefit from further workup given her current persistent unclear possibly giveaway weakness    NIHSS 4 for RLE drift and right sensory loss   mRS 0  LKW 6:30 am    plan:  [] if places available, can admit patient to CDU  [] please obtain MRI brain without contrast  [] obtain MRI cervical spine with and without contrast  [] hold off on ac/ap at this point until MRI brain is back    case discussed with stroke fellow Jakub Malone

## 2024-05-14 NOTE — ED PROVIDER NOTE - PROGRESS NOTE DETAILS
Juliann García DO (PGY3):  CT negative for acute pathology.  neuro recommending MRI.  Patient unable to ambulate due to right lower extremity and right upper extremity weakness, patient will need admission for MRI and PT eval.

## 2024-05-14 NOTE — H&P ADULT - PROBLEM SELECTOR PLAN 2
Patient with hx of COPD, current smoker   -Was recently started on Varenicline, hold for now   -Wheezing noted on exam  -C/w sapna PRN   -C/w Stiolto

## 2024-05-14 NOTE — ED PROVIDER NOTE - GENITOURINARY NEGATIVE STATEMENT, MLM
You can access the [facility], offered by [facility], by registering with the following website: [email or web address] no dysuria, no frequency, and no hematuria.

## 2024-05-14 NOTE — ED ADULT TRIAGE NOTE - CHIEF COMPLAINT QUOTE
pt bought in by her son, pt fell and hit her head at home around 11am. pt arrives awake but with weakness to her left side. Pt looks sleepy. fs 142

## 2024-05-14 NOTE — H&P ADULT - PROBLEM SELECTOR PLAN 4
Patient was not able to provide her full medication list. Please confirm with outpatient pharmacy. Pharmacy emailed.

## 2024-05-14 NOTE — PATIENT PROFILE ADULT - FALL HARM RISK - HARM RISK INTERVENTIONS

## 2024-05-14 NOTE — ED ADULT NURSE NOTE - HISTORY OF COVID-19 VACCINATION
Vaccine status unknown [] : results reviewed [de-identified] : Sinus rhythm with occasional PAC.  LAFB, Anterosetal infact  Non specific T wave abnormality  [de-identified] : \par last a1c was 10.0 9/20

## 2024-05-14 NOTE — H&P ADULT - NSHPPHYSICALEXAM_GEN_ALL_CORE
Vital Signs Last 24 Hrs  T(C): 36.4 (14 May 2024 15:53), Max: 36.8 (14 May 2024 13:14)  T(F): 97.5 (14 May 2024 15:53), Max: 98.2 (14 May 2024 13:14)  HR: 74 (14 May 2024 15:53) (71 - 85)  BP: 111/73 (14 May 2024 15:53) (111/73 - 124/77)  BP(mean): --  RR: 18 (14 May 2024 15:53) (18 - 18)  SpO2: 94% (14 May 2024 15:53) (94% - 100%)    Parameters below as of 14 May 2024 15:53  Patient On (Oxygen Delivery Method): room air    GENERAL: NAD, well-developed  HEENT:  Atraumatic, Normocephalic, EOMI, PERRLA, conjunctiva and sclera clear, oral mucosa moist, clear w/o any exudate   NECK: Supple, No JVD  CHEST/LUNG: good airflow, b/l wheezing   HEART: Regular rate and rhythm; No murmurs, rubs, or gallops  ABDOMEN: Soft, Nontender, Nondistended; Bowel sounds present  EXTREMITIES:  2+ Peripheral Pulses, No clubbing, cyanosis, or edema  PSYCH: AAOx3  NEUROLOGY: Chronic R sided hearing loss, otherwise, cranial nerve 2-12 grossly intact, sensation grossly intact, strength 4/5 in RLE which improved on repeat exam, 5/5 in all other extremities.   SKIN: No rashes or lesions

## 2024-05-14 NOTE — ED ADULT NURSE NOTE - OBJECTIVE STATEMENT
NIMCO RN: Patient is a 59-year-old female, alert and oriented at present, Phx of bipolar disorder, Meniere's, COPD exacerbation received to CT scan as a code stroke complaining of right sided weakness. Pt had an acute onset of dizziness with difficulty ambulating. Patient reports had a fall at around 1100AM. Patient last known normal around 0630 am. Patient arrives with son at bedside. Neuro at bedside. R facial droop noted at time of incident. 20 G IV placed in R AC. Labs drawn and sent. Bed set in lowest position. Safety being maintained.

## 2024-05-14 NOTE — ED PROVIDER NOTE - PHYSICAL EXAMINATION
GENERAL: Awake. Alert. NAD. Well nourished.  HEENT: NC/AT, PERRL, EOMI, Conjunctiva pink, no scleral icterus. Airway patent  LUNGS: CTAB. No wheezes or rales noted.  CARDIAC: RRR.  ABDOMEN: No masses noted. Soft, NT, ND, no rebound, no guarding.  BACK: No midline spinal tenderness  EXT: No edema, no calf tenderness, distal pulses 2+ bilaterally  NEURO: A&Ox3. Moving all extremities. Sensation intact. RLE and RUE weakness 4/5.   SKIN: Warm and dry.   PSYCH: Normal affect.

## 2024-05-14 NOTE — ED ADULT NURSE NOTE - NS ED NOTE ABUSE SUSPICION NEGLECT YN
Nursing notes reviewed and accepted.    Yumi Gore is a 15 year old female who presents for well child exam.  Patient presents with Mother.    Diet/feeding:  Eating and drinking okay.  Not consciously monitoring her food choices or portion sizes.  Very picky with vegetables.  Drinks occasional milk but mostly water  Elimination Patterns:  Denies any problems  Sleep:  Sleeping well at night  Social/School: In 9th grade at [employer].  Patient has been doing well in school.  Will play volleyball    Menstratng x 2-3    Concerns raised today include: none    Medical history, surgical history, and family history reviewed and updated.    PHYSICAL EXAM:  Blood pressure 118/84, pulse 72, temperature 97.1 °F (36.2 °C), temperature source Temporal, resp. rate 20, height 5' 2\" (1.575 m), weight 62.5 kg (137 lb 12.8 oz), last menstrual period 06/13/2023.  GENERAL:  Well appearing female, nontoxic, no acute distress.  Alert and interactive.  SKIN: Warm, normal turgor.  No cyanosis. Faint dry eczematous patches on the neck and antecubital region bilaterally  HEAD:  Normocephalic, atraumatic.    EYES:  Conjunctivae without injection or icterus.  Pupils equal, round, reactive to light; extraocular movements intact.  NOSE: No flaring.  EARS:  Tympanic membranes transparent with good landmarks.  THROAT:  Oropharynx with moist mucous membranes and no lesions.  NECK:  Supple, no lymphadenopathy or masses.  HEART:  Regular rate and rhythm.  Quiet precordium.  Normal S1, S2.  No murmurs, rubs, gallops.   LUNGS:  Clear to auscultation bilaterally.  No wheezes, rales, rhonchi.  Normal work of breathing.  ABDOMEN:  Soft, nontender.  No organomegaly or masses.  GENITOURINARY:  Deferred      EXTREMITIES:  Warm, dry, without abnormalities.  NEUROLOGIC:  Normal tone, bulk, strength.    ASSESSMENT:  15 year old female well child.  BMI @ 89%  Atopic dermatitis    PLAN:  1. Discussed with patient and parents regarding patient elevated BMI.  2.  Recommend monitoring portion sizes and limiting frequent snacking especially of higher calorie foods and drinks. Decrease intake of carbs and foods and drinks with elevated sugar content. Decrease intake of foods high in fat. Encourage more fruits and vegetables.  3. Recommend increasing daily activity level. Encourage at least 30-45 minutes of moderate intensity exercise. Recommend enrolling patient in sports if not currently involved  4. Limit screen time on tv, computers, phones, tablets etc.  5. Continue with good skin moisturizing and topical triamcinolone prn    All parental concerns and questions discussed.  Anticipatory guidance provided, handout given.              Safety/car/bicycle/fire/sharp objects/falls/water              Development              Discipline              Diet              Television              Analgesics/antipyretics              Sun exposure              Tobacco-free home              Dental care                            Immunizations per orders.  Risks, benefits, and side effects discussed.  Return to clinic in 1 year for well child examination or sooner prn illness/concerns.                   No

## 2024-05-14 NOTE — CONSULT NOTE ADULT - ATTENDING COMMENTS
59F with COPD, Meniere's s/p R labyrinthectomy, bipolar disorder here as stroke code for vertigo and R hemiparesis associated with pain s/p fall. She had similar episode a few months ago with neg MR at that time but got tnk   CTH/A/P unrevealing however CTA neck limited due to streak artifact. Previously had L ICA ulcerated plaque. MRI brain negative  o/e with slight R facial asymmetry and RUE/RLE drift.   Vertigo in setting of known Meniere's disease  Recurrent R hemiparesis - low suspicion for TIA but in setting of known L ICA ulcerated plaque would continue to treat conservatively   - MR Cspine can be done as outpatient  - would also suggest either MRA h/n and /or carotid dopplers to better eval L ICA which can also be done as outpatient  - continue aspirn 81mg and high dose statin.     Outpatient followup on d/c at [phone]

## 2024-05-14 NOTE — H&P ADULT - NSHPLABSRESULTS_GEN_ALL_CORE
15.5   7.65  )-----------( 255      ( 14 May 2024 13:25 )             45.4     Hgb Trend: 15.5<--  05-14    132<L>  |  94<L>  |  9   ----------------------------<  137<H>  4.2   |  24  |  0.69    Ca    9.0      14 May 2024 13:25    TPro  7.0  /  Alb  3.9  /  TBili  0.4  /  DBili  x   /  AST  46<H>  /  ALT  29  /  AlkPhos  158<H>  05-14    Creatinine Trend: 0.69<--  PT/INR - ( 14 May 2024 13:25 )   PT: 10.5 sec;   INR: 0.93 ratio         PTT - ( 14 May 2024 13:25 )  PTT:35.5 sec      Urinalysis Basic - ( 14 May 2024 13:25 )    Color: x / Appearance: x / SG: x / pH: x  Gluc: 137 mg/dL / Ketone: x  / Bili: x / Urobili: x   Blood: x / Protein: x / Nitrite: x   Leuk Esterase: x / RBC: x / WBC x   Sq Epi: x / Non Sq Epi: x / Bacteria: x        EKG is reviewed by me

## 2024-05-14 NOTE — ED PROVIDER NOTE - ATTENDING CONTRIBUTION TO CARE
Pt was seen and evaluated by me. Pt is a 58 y/o female with PMHx Bipolar, COPD, and Meniere's disease who presented to the ED for right sided weakness today. As per son pt was last n Presents emergency department with right upper extremity and right lower extremity weakness worsening after unwitnessed mechanical fall that occurred earlier in the day 11am, last known well at 6:30 AM,  patient reports prior to fall they had generalized headache and nausea, no dizziness or lightheadedness, unclear LOC, code stroke activated for right upper extremity and right lower extremity weakness and paresthesias.  Patient denies chest pain, vomiting, shortness of breath.  Reports persistent headache. Pt was seen and evaluated by me. Pt is a 60 y/o female with PMHx Bipolar, COPD, and Meniere's disease who presented to the ED for right sided weakness today. As per son pt was last known normal was at 6:30am today and then he received a call at 11am that the pt had fallen and felt weak. Son arrived home and noted pt had right sided weakness with right facial droop. Pt admits to headache with nausea. Pt denies any fever, chills, nausea, vomiting, SOB, chest pain, or abd pain.   VITALS: Vitals have been reviewed.  GEN APPEARANCE: Alert and cooperative, non-toxic appearing and in NAD  HEAD: Atraumatic, normocephalic.   EYES: PERRL, EOMI.   EARS: Gross hearing intact.   NOSE: No nasal discharge.   THROAT: MMM. Oral cavity and pharynx normal. Uvula midline.   NECK: Supple, no lymphadenopathy  CV: RRR, S1S2, no c/r/m/g. No cyanosis or pallor.   LUNGS: CTAB. No wheezing. No rales. No rhonchi. No diminished breath sounds.   ABDOMEN: Soft, NTND. No guarding or rebound.   MSK/EXT: Spine appears normal, no spine point tenderness. Right UE and LE 3/5, Left UE and LE 5/5  NEURO: Alert, follows commands. Speech normal. Sensation and motor normal x4 extremities. Right sided facial droop.   SKIN: Normal color for race, warm, dry and intact. No evidence of rash.  60 y/o female with PMHx Bipolar, COPD, and Meniere's disease who presented to the ED for right sided weakness today.   Concern for CVA, TIA, Metabolic Derangement  Called as a stroke code, will obtain CT, labs and have Neuro eval.

## 2024-05-14 NOTE — ED PROVIDER NOTE - CLINICAL SUMMARY MEDICAL DECISION MAKING FREE TEXT BOX
60 y/o female with PMHx Bipolar, COPD, and Meniere's disease Presents emergency department with right upper extremity and right lower extremity weakness worsening after unwitnessed mechanical fall that occurred earlier in the day 11am, last known well at 6:30 AM,  patient reports prior to fall they had generalized headache and nausea, no dizziness or lightheadedness, unclear LOC, code stroke activated for right upper extremity and right lower extremity weakness and paresthesias. Given hx and physical, ddx includes but is not limited to   Stroke, metabolic derangement, complex migraine, ICH.  Plan for CT CTA, EKG, labs, pain control, likely admission pending neuro recs

## 2024-05-14 NOTE — H&P ADULT - NSHPREVIEWOFSYSTEMS_GEN_ALL_CORE
REVIEW OF SYSTEMS:    CONSTITUTIONAL: No weakness, fevers or chills  EYES/ENT: No visual changes;  No vertigo or throat pain   NECK: No pain or stiffness  RESPIRATORY: No cough, wheezing, hemoptysis; No shortness of breath  CARDIOVASCULAR: No chest pain or palpitations  GASTROINTESTINAL: No abdominal or epigastric pain. No nausea, vomiting, or hematemesis; No diarrhea or constipation. No melena or hematochezia.  GENITOURINARY: No dysuria, frequency or hematuria  NEUROLOGICAL: +R sided weakness,  MUSCULOSKELETAL: No joint pain, no muscle ache   SKIN: No itching, burning, rashes, or lesions   All other review of systems is negative unless indicated above.

## 2024-05-14 NOTE — H&P ADULT - PROBLEM SELECTOR PLAN 1
Patient with acute fall due to dizziness and gait imbalance   -Stroke code was called, neurology recs appreciated   -CTA head and neck showed patent intracranial circulation  -Low suspicion for stroke; most likely due to chronic Meniere's disease   -F/u with MRI brain   -F/u with TTE   -C/w telemonitoring   -PT eval

## 2024-05-14 NOTE — PATIENT PROFILE ADULT - DO YOU FEEL UNSAFE AT HOME, WORK, OR SCHOOL?
none no Adbry Pregnancy And Lactation Text: It is unknown if this medication will adversely affect pregnancy or breast feeding.

## 2024-05-14 NOTE — H&P ADULT - HISTORY OF PRESENT ILLNESS
60 yo F with Meniere's disease, HTN, Bipolar disorder, and COPD presents to the ED after a fall. Patient reports that she fell because she felt weak in her legs and lost balance. She also had some dizziness. She hit her head and R leg when she fell. Unclear if she lost consciousness. She was able to get up and help herself to the chair. No bowel or urinary incontinence. Afterwards, she developed R sided weakness and facial droop, prompting her to come to the ED. Currently, she reports that she feels better. She has some weakness in RLE but no facial droop. She denies any fever, chills, chest pain, palpitations, or LE edema.   In the ED, her vitals were WNL. Labs were notable for mild anemia. EKG showed NSR. CT head and neck showed no acute infract.

## 2024-05-14 NOTE — STROKE CODE NOTE - IMAGING RESULTS
Recovery period without difficulty. Pt alert and oriented and denies pain. Dressing is clean, dry, and intact. Reviewed discharge instructions with patient and wife, both verbalized understanding. Pt escorted to lobby discharge area via wheelchair. Non-hemorrhagic

## 2024-05-14 NOTE — STROKE CODE NOTE - NIH STROKE SCALE: 1A. LEVEL OF CONSCIOUSNESS, QM
(0) Alert; keenly responsive Interpolation Flap Text: A decision was made to reconstruct the defect utilizing an interpolation axial flap and a staged reconstruction.  A telfa template was made of the defect.  This telfa template was then used to outline the interpolation flap.  The donor area for the pedicle flap was then injected with anesthesia.  The flap was excised through the skin and subcutaneous tissue down to the layer of the underlying musculature.  The interpolation flap was carefully excised within this deep plane to maintain its blood supply.  The edges of the donor site were undermined.   The donor site was closed in a primary fashion.  The pedicle was then rotated into position and sutured.  Once the tube was sutured into place, adequate blood supply was confirmed with blanching and refill.  The pedicle was then wrapped with xeroform gauze and dressed appropriately with a telfa and gauze bandage to ensure continued blood supply and protect the attached pedicle.

## 2024-05-14 NOTE — CONSULT NOTE ADULT - SUBJECTIVE AND OBJECTIVE BOX
Neurology - Consult Note    -  Spectra: [phone] ([facility]), [phone] ([facility])  -    HPI: Patient KENTRELL FLEMING is a 59y (1965)  right handed woman with a PMHx significant for COPD, Meniere's disease s/p R labyrinthectomy, bipolar disorder who presents as code stroke for acute onset dizziness, difficulty ambulating. She was in her normal state of health until today when she experienced a sudden fall at around 11 am when she texted her son for symptoms of dizziness and room spinning. She was not able to move and had a fall. She was last seen by her son at around 6:30 am. She was otherwise noticed to have a right facial droop by her son when he saw her after the call around 11 am. She otherwise has been having recurrent symptoms of dizziness in the past due to her Meniere's disease. She otherwise has had an episode back in October for which she received tenecteplase but her MRI brain was negative for any acute infarct back then. She should be on aspirin at this point per our last record.    Review of Systems:    CONSTITUTIONAL: No fevers or chills  EYES AND ENT: No visual changes or no throat pain   NECK: No pain or stiffness  RESPIRATORY: No hemoptysis or shortness of breath  CARDIOVASCULAR: No chest pain or palpitations  GASTROINTESTINAL: No melena or hematochezia  GENITOURINARY: No dysuria or hematuria  NEUROLOGICAL: +As stated in HPI above  SKIN: No itching, burning, rashes, or lesions   All other review of systems is negative unless indicated above.    Allergies:  No Known Allergies    PMHx/PSHx/Family Hx: As above, otherwise see below   COPD exacerbation    History of Meniere's disease    Bipolar disorder    Social Hx:  No current use of tobacco, alcohol, or illicit drugs    Medications:  MEDICATIONS  (STANDING):  metoclopramide Injectable 10 milliGRAM(s) IV Push once    MEDICATIONS  (PRN):    Vitals:  T(C): 36.8 (05-14-24 @ 13:14), Max: 36.8 (05-14-24 @ 13:14)  HR: 71 (05-14-24 @ 13:14) (71 - 71)  BP: 124/77 (05-14-24 @ 13:14) (124/77 - 124/77)  RR: 18 (05-14-24 @ 13:14) (18 - 18)  SpO2: 100% (05-14-24 @ 13:14) (100% - 100%)    Physical Examination:   General - NAD  Cardiovascular - Peripheral pulses palpable, no edema    Neurologic Exam:  Mental status - Awake, Alert, Oriented to person, place, and time. Speech fluent, repetition and naming intact. Follows simple and complex commands.     Cranial nerves - PERRLA, VFF, EOMI, face sensation (V1-V3) intact b/l, facial strength intact without asymmetry b/l, hearing intact b/l, palate with symmetric elevation, sternocleidomastoid 5/5 strength b/l, tongue midline on protrusion with full lateral movement    Motor - Normal bulk and tone throughout. No pronator drift.  Strength testing            Deltoid      Biceps      Triceps     Wrist Extension    Wrist Flexion     Interossei         R            5                 5               5                     5                              5                        5                 5  L             5                 5               5                     5                              5                        5                 5              Hip Flexion    Hip Extension    Knee Flexion    Knee Extension    Dorsiflexion    Plantar Flexion  R              5                           5                       5                           5                            5                          5  L              5                           5                        5                           5                            5                          5    Sensation - Light touch/temperature OR pain/vibration intact throughout    DTR's -             Biceps      Triceps     Brachioradialis      Patellar    Ankle    Toes/plantar response  R             2+             2+                  2+                       2+            2+                 Down  L              2+             2+                 2+                        2+           2+                 Down    Coordination - Finger to Nose intact b/l. No tremors appreciated    Gait and station - Normal casual gait. Romberg (-)    Labs:          CAPILLARY BLOOD GLUCOSE  142 (14 May 2024 13:28)      POCT Blood Glucose.: 142 mg/dL (14 May 2024 13:12)          CSF:                  Radiology:     Neurology - Consult Note    -  Spectra: [phone] ([facility]), [phone] ([facility])  -  HPI: Patient KENTRELL FLEMING is a 59y (1965)  right handed woman with a PMHx significant for COPD, Meniere's disease s/p R labyrinthectomy, bipolar disorder who presents as code stroke for acute onset dizziness, difficulty ambulating. She was in her normal state of health until today when she experienced a sudden fall at around 11 am when she texted her son for symptoms of dizziness and room spinning. She was not able to move and had a fall. She expresses that she had a sudden spinning sensation then felt significant fatigue. The spinning sensation progressively started improving after the fall but patient fell on her right side and endorsed pain on the right side. She was last seen by her son at around 6:30 am. She was otherwise noticed to have a right facial droop by her son when he saw her after the call around 11 am. She otherwise has been having recurrent symptoms of dizziness in the past due to her Meniere's disease. She otherwise has had an episode back in October for which she received tenecteplase but her MRI brain was negative for any acute infarct back then. She should be on aspirin at this point per our last record. However, patient visited one of the outpatient neurologists who suggested that the antiplatelets are not required at this point.     Review of Systems:    NEUROLOGICAL: +As stated in HPI above  All other review of systems is negative unless indicated above.    Allergies:  No Known Allergies    PMHx/PSHx/Family Hx: As above, otherwise see below   COPD exacerbation    History of Meniere's disease    Bipolar disorder    Social Hx:  No current use of tobacco, alcohol, or illicit drugs    Medications:  MEDICATIONS  (STANDING):  metoclopramide Injectable 10 milliGRAM(s) IV Push once    MEDICATIONS  (PRN):    Vitals:  T(C): 36.8 (05-14-24 @ 13:14), Max: 36.8 (05-14-24 @ 13:14)  HR: 71 (05-14-24 @ 13:14) (71 - 71)  BP: 124/77 (05-14-24 @ 13:14) (124/77 - 124/77)  RR: 18 (05-14-24 @ 13:14) (18 - 18)  SpO2: 100% (05-14-24 @ 13:14) (100% - 100%)    Physical Examination:   General - NAD  Cardiovascular - Peripheral pulses palpable, no edema    Neurologic Exam:  Mental status - Awake, Alert, Oriented to person, place, and time. Speech fluent, repetition and naming intact. Follows simple and complex commands.     Cranial nerves - VFF, EOMI, face sensation (V1-V3) intact b/l, facial strength intact without asymmetry b/l    Motor - Normal bulk and tone throughout. No pronator drift.  Strength testing            Deltoid            Biceps      Triceps             R            5                 5               5                     5                               L             5                 5               5                     5                                          Hip Flexion    Hip Extension    Knee Flexion    Knee Extension    Dorsiflexion    Plantar Flexion  R              5                           5                       5                           5                            5                          5  L              5                           5                        5                           5                            5                          5    Sensation - Light touch intact throughout    DTR's -             Biceps      Triceps     Brachioradialis      Patellar    Ankle    Toes/plantar response  R             2+             2+                  2+                       2+            0+                 equi  L              2+             2+                 2+                        2+           0+                equi    Coordination - Finger to Nose intact b/l. No tremors appreciated. HTS intact bilateral.    Gait and station - patient unable to ambulate at this point due to generalized weakness.    Labs:    CAPILLARY BLOOD GLUCOSE  142 (14 May 2024 13:28)    POCT Blood Glucose.: 142 mg/dL (14 May 2024 13:12)          CSF:                  Radiology:    < from: CT Brain Stroke Protocol (05.14.24 @ 14:26) >    No acute intracranial bleeding.  Chronic bilateral corona radiata lacunar infarctions.    < end of copied text >  < from: CT Angio Brain Stroke Protocol  w/ IV Cont (05.14.24 @ 13:59) >  CT PERFUSION: No regional perfusion abnormality.    CTA BRAIN: Patent intracranial circulation. No flow-limiting stenosis or   occlusion.    CTA NECK: Allowing for streak artifact through the base of the neck due   to elevated shoulders and limited evaluation of the proximal, carotid and   vertebral arteries, no evidence of flow limiting stenosis or occlusion.      < end of copied text >

## 2024-05-14 NOTE — ED PROVIDER NOTE - OBJECTIVE STATEMENT
60 y/o female with PMHx Bipolar, COPD, and Meniere's disease 60 y/o female with PMHx Bipolar, COPD, and Meniere's disease Presents emergency department with right upper extremity and right lower extremity weakness worsening after unwitnessed mechanical fall that occurred earlier in the day 11am, last known well at 6:30 AM,  patient reports prior to fall they had generalized headache and nausea, no dizziness or lightheadedness, unclear LOC, code stroke activated for right upper extremity and right lower extremity weakness and paresthesias.  Patient denies chest pain, vomiting, shortness of breath.  Reports persistent headache.

## 2024-05-14 NOTE — ED ADULT NURSE NOTE - NSFALLHARMRISKINTERV_ED_ALL_ED
Assistance OOB with selected safe patient handling equipment if applicable/Assistance with ambulation/Communicate risk of Fall with Harm to all staff, patient, and family/Encourage patient to sit up slowly, dangle for a short time, stand at bedside before walking/Monitor gait and stability/Orthostatic vital signs/Provide visual cue: red socks, yellow wristband, yellow gown, etc/Reinforce activity limits and safety measures with patient and family/Bed in lowest position, wheels locked, appropriate side rails in place/Call bell, personal items and telephone in reach/Instruct patient to call for assistance before getting out of bed/chair/stretcher/Non-slip footwear applied when patient is off stretcher/[place] to call system/Physically safe environment - no spills, clutter or unnecessary equipment/Purposeful Proactive Rounding/Room/bathroom lighting operational, light cord in reach

## 2024-05-14 NOTE — H&P ADULT - ASSESSMENT
58 yo F with Meniere's disease, HTN, Bipolar disorder, and COPD presents to the ED after a fall, admitted for further work up.

## 2024-05-14 NOTE — ED PROVIDER NOTE - CONSIDERATION OF ADMISSION OBSERVATION
Consideration of Admission/Observation Given concern for CVA with difficulty walking with falls and right sided weakness.

## 2024-05-15 VITALS
DIASTOLIC BLOOD PRESSURE: 73 MMHG | HEART RATE: 71 BPM | WEIGHT: 175.49 LBS | RESPIRATION RATE: 17 BRPM | OXYGEN SATURATION: 93 % | SYSTOLIC BLOOD PRESSURE: 133 MMHG | TEMPERATURE: 98 F

## 2024-05-15 LAB
A1C WITH ESTIMATED AVERAGE GLUCOSE RESULT: 5.9 % — HIGH (ref 4–5.6)
ALBUMIN SERPL ELPH-MCNC: 4.2 G/DL — SIGNIFICANT CHANGE UP (ref 3.3–5)
ALP SERPL-CCNC: 149 U/L — HIGH (ref 40–120)
ALT FLD-CCNC: 29 U/L — SIGNIFICANT CHANGE UP (ref 4–33)
ANION GAP SERPL CALC-SCNC: 16 MMOL/L — HIGH (ref 7–14)
AST SERPL-CCNC: 29 U/L — SIGNIFICANT CHANGE UP (ref 4–32)
BASOPHILS # BLD AUTO: 0.02 K/UL — SIGNIFICANT CHANGE UP (ref 0–0.2)
BASOPHILS NFR BLD AUTO: 0.3 % — SIGNIFICANT CHANGE UP (ref 0–2)
BILIRUB SERPL-MCNC: 0.4 MG/DL — SIGNIFICANT CHANGE UP (ref 0.2–1.2)
BUN SERPL-MCNC: 11 MG/DL — SIGNIFICANT CHANGE UP (ref 7–23)
CALCIUM SERPL-MCNC: 8.9 MG/DL — SIGNIFICANT CHANGE UP (ref 8.4–10.5)
CHLORIDE SERPL-SCNC: 96 MMOL/L — LOW (ref 98–107)
CHOLEST SERPL-MCNC: 124 MG/DL — SIGNIFICANT CHANGE UP
CO2 SERPL-SCNC: 25 MMOL/L — SIGNIFICANT CHANGE UP (ref 22–31)
CREAT SERPL-MCNC: 0.76 MG/DL — SIGNIFICANT CHANGE UP (ref 0.5–1.3)
EGFR: 90 ML/MIN/1.73M2 — SIGNIFICANT CHANGE UP
EOSINOPHIL # BLD AUTO: 0.01 K/UL — SIGNIFICANT CHANGE UP (ref 0–0.5)
EOSINOPHIL NFR BLD AUTO: 0.2 % — SIGNIFICANT CHANGE UP (ref 0–6)
ESTIMATED AVERAGE GLUCOSE: 123 — SIGNIFICANT CHANGE UP
GLUCOSE SERPL-MCNC: 122 MG/DL — HIGH (ref 70–99)
HCT VFR BLD CALC: 45.2 % — HIGH (ref 34.5–45)
HDLC SERPL-MCNC: 29 MG/DL — LOW
HGB BLD-MCNC: 14.9 G/DL — SIGNIFICANT CHANGE UP (ref 11.5–15.5)
IANC: 3.52 K/UL — SIGNIFICANT CHANGE UP (ref 1.8–7.4)
IMM GRANULOCYTES NFR BLD AUTO: 0.3 % — SIGNIFICANT CHANGE UP (ref 0–0.9)
LIPID PNL WITH DIRECT LDL SERPL: 73 MG/DL — SIGNIFICANT CHANGE UP
LYMPHOCYTES # BLD AUTO: 1.84 K/UL — SIGNIFICANT CHANGE UP (ref 1–3.3)
LYMPHOCYTES # BLD AUTO: 31.7 % — SIGNIFICANT CHANGE UP (ref 13–44)
MCHC RBC-ENTMCNC: 28.2 PG — SIGNIFICANT CHANGE UP (ref 27–34)
MCHC RBC-ENTMCNC: 33 GM/DL — SIGNIFICANT CHANGE UP (ref 32–36)
MCV RBC AUTO: 85.6 FL — SIGNIFICANT CHANGE UP (ref 80–100)
MONOCYTES # BLD AUTO: 0.39 K/UL — SIGNIFICANT CHANGE UP (ref 0–0.9)
MONOCYTES NFR BLD AUTO: 6.7 % — SIGNIFICANT CHANGE UP (ref 2–14)
MRSA PCR RESULT.: SIGNIFICANT CHANGE UP
NEUTROPHILS # BLD AUTO: 3.52 K/UL — SIGNIFICANT CHANGE UP (ref 1.8–7.4)
NEUTROPHILS NFR BLD AUTO: 60.8 % — SIGNIFICANT CHANGE UP (ref 43–77)
NON HDL CHOLESTEROL: 95 MG/DL — SIGNIFICANT CHANGE UP
NRBC # BLD: 0 /100 WBCS — SIGNIFICANT CHANGE UP (ref 0–0)
NRBC # FLD: 0 K/UL — SIGNIFICANT CHANGE UP (ref 0–0)
PLATELET # BLD AUTO: 260 K/UL — SIGNIFICANT CHANGE UP (ref 150–400)
POTASSIUM SERPL-MCNC: 3.5 MMOL/L — SIGNIFICANT CHANGE UP (ref 3.5–5.3)
POTASSIUM SERPL-SCNC: 3.5 MMOL/L — SIGNIFICANT CHANGE UP (ref 3.5–5.3)
PROT SERPL-MCNC: 7 G/DL — SIGNIFICANT CHANGE UP (ref 6–8.3)
RBC # BLD: 5.28 M/UL — HIGH (ref 3.8–5.2)
RBC # FLD: 17.8 % — HIGH (ref 10.3–14.5)
S AUREUS DNA NOSE QL NAA+PROBE: SIGNIFICANT CHANGE UP
SODIUM SERPL-SCNC: 137 MMOL/L — SIGNIFICANT CHANGE UP (ref 135–145)
TRIGL SERPL-MCNC: 112 MG/DL — SIGNIFICANT CHANGE UP
WBC # BLD: 5.8 K/UL — SIGNIFICANT CHANGE UP (ref 3.8–10.5)
WBC # FLD AUTO: 5.8 K/UL — SIGNIFICANT CHANGE UP (ref 3.8–10.5)

## 2024-05-15 PROCEDURE — 93306 TTE W/DOPPLER COMPLETE: CPT | Mod: 26

## 2024-05-15 PROCEDURE — 99239 HOSP IP/OBS DSCHRG MGMT >30: CPT

## 2024-05-15 PROCEDURE — 70551 MRI BRAIN STEM W/O DYE: CPT | Mod: 26

## 2024-05-15 RX ORDER — TRIAMTERENE/HYDROCHLOROTHIAZID 75 MG-50MG
1 TABLET ORAL
Refills: 0 | DISCHARGE

## 2024-05-15 RX ORDER — LAMOTRIGINE 25 MG/1
1 TABLET, ORALLY DISINTEGRATING ORAL
Refills: 0 | DISCHARGE

## 2024-05-15 RX ORDER — METHYLPHENIDATE HCL 5 MG
1 TABLET ORAL
Refills: 0 | DISCHARGE

## 2024-05-15 RX ORDER — FLUOXETINE HCL 10 MG
1 CAPSULE ORAL
Refills: 0 | DISCHARGE

## 2024-05-15 RX ORDER — LEVOTHYROXINE SODIUM 125 MCG
1 TABLET ORAL
Refills: 0 | DISCHARGE

## 2024-05-15 RX ORDER — IPRATROPIUM/ALBUTEROL SULFATE 18-103MCG
3 AEROSOL WITH ADAPTER (GRAM) INHALATION
Refills: 0 | DISCHARGE

## 2024-05-15 RX ORDER — LEVOTHYROXINE SODIUM 125 MCG
1 TABLET ORAL
Qty: 0 | Refills: 0 | DISCHARGE
Start: 2024-05-15

## 2024-05-15 RX ORDER — ATORVASTATIN CALCIUM 80 MG/1
1 TABLET, FILM COATED ORAL
Refills: 0 | DISCHARGE

## 2024-05-15 RX ORDER — CHLORHEXIDINE GLUCONATE 213 G/1000ML
1 SOLUTION TOPICAL DAILY
Refills: 0 | Status: DISCONTINUED | OUTPATIENT
Start: 2024-05-15 | End: 2024-05-15

## 2024-05-15 RX ORDER — NICOTINE POLACRILEX 2 MG
2 GUM BUCCAL EVERY 6 HOURS
Refills: 0 | Status: DISCONTINUED | OUTPATIENT
Start: 2024-05-15 | End: 2024-05-15

## 2024-05-15 RX ORDER — MULTIVIT-MIN/FERROUS GLUCONATE 9 MG/15 ML
1 LIQUID (ML) ORAL
Refills: 0 | DISCHARGE

## 2024-05-15 RX ORDER — FLUTICASONE PROPIONATE 50 MCG
2 SPRAY, SUSPENSION NASAL
Refills: 0 | DISCHARGE

## 2024-05-15 RX ORDER — UMECLIDINIUM BROMIDE AND VILANTEROL TRIFENATATE 62.5; 25 UG/1; UG/1
1 POWDER RESPIRATORY (INHALATION)
Refills: 0 | DISCHARGE

## 2024-05-15 RX ORDER — CALCIUM CARBONATE 500(1250)
1 TABLET ORAL
Refills: 0 | DISCHARGE

## 2024-05-15 RX ORDER — PANTOPRAZOLE SODIUM 20 MG/1
1 TABLET, DELAYED RELEASE ORAL
Refills: 0 | DISCHARGE

## 2024-05-15 RX ORDER — ARIPIPRAZOLE 15 MG/1
1 TABLET ORAL
Refills: 0 | DISCHARGE

## 2024-05-15 RX ORDER — FUROSEMIDE 40 MG
1 TABLET ORAL
Refills: 0 | DISCHARGE

## 2024-05-15 RX ORDER — ALBUTEROL 90 UG/1
2 AEROSOL, METERED ORAL
Refills: 0 | DISCHARGE

## 2024-05-15 RX ORDER — NYSTATIN CREAM 100000 [USP'U]/G
1 CREAM TOPICAL
Refills: 0 | DISCHARGE

## 2024-05-15 RX ORDER — LIDOCAINE 4 G/100G
1 CREAM TOPICAL
Refills: 0 | DISCHARGE

## 2024-05-15 RX ADMIN — CHLORHEXIDINE GLUCONATE 1 APPLICATION(S): 213 SOLUTION TOPICAL at 12:04

## 2024-05-15 RX ADMIN — TIOTROPIUM BROMIDE AND OLODATEROL 2 PUFF(S): 3.124; 2.736 SPRAY, METERED RESPIRATORY (INHALATION) at 12:26

## 2024-05-15 RX ADMIN — LAMOTRIGINE 150 MILLIGRAM(S): 25 TABLET, ORALLY DISINTEGRATING ORAL at 17:07

## 2024-05-15 RX ADMIN — Medication 650 MILLIGRAM(S): at 10:00

## 2024-05-15 RX ADMIN — Medication 650 MILLIGRAM(S): at 09:25

## 2024-05-15 RX ADMIN — LAMOTRIGINE 150 MILLIGRAM(S): 25 TABLET, ORALLY DISINTEGRATING ORAL at 06:01

## 2024-05-15 RX ADMIN — Medication 40 MILLIGRAM(S): at 12:02

## 2024-05-15 RX ADMIN — ENOXAPARIN SODIUM 40 MILLIGRAM(S): 100 INJECTION SUBCUTANEOUS at 05:59

## 2024-05-15 RX ADMIN — Medication 25 MICROGRAM(S): at 06:00

## 2024-05-15 NOTE — DISCHARGE NOTE NURSING/CASE MANAGEMENT/SOCIAL WORK - PATIENT PORTAL LINK FT
You can access the FollowMyHealth Patient Portal offered by [facility] by registering at the following website: [email or web address]. By joining [facility]’s FollowMyHealth portal, you will also be able to view your health information using other applications (apps) compatible with our system.

## 2024-05-15 NOTE — PHARMACOTHERAPY INTERVENTION NOTE - COMMENTS
Medication history is incomplete. Outpatient pharmacies currently closed, will follow up once reopened. Please call spectra [phone] if you have any questions. 
Medication history update: Medication history is complete. Medication list updated in Outpatient Medication Record (OMR) per patient, [facility], [facility] and [facility]. Please call [employer] [phone] if you have any questions.

## 2024-05-15 NOTE — DISCHARGE NOTE PROVIDER - ATTENDING DISCHARGE PHYSICAL EXAMINATION:
Patient seen and examined at bedside. Patient feels well overall. Denies any headaches, fevers, chills. Able to ambulate without difficulty.       T(C): 36.4 (05-15-24 @ 05:55), Max: 36.4 (05-15-24 @ 05:55)  HR: 71 (05-15-24 @ 05:55) (63 - 71)  BP: 133/73 (05-15-24 @ 05:55) (118/60 - 133/73)  RR: 17 (05-15-24 @ 05:55) (16 - 17)  SpO2: 93% (05-15-24 @ 05:55) (93% - 93%)    CONSTITUTIONAL: Well groomed, no apparent distress  RESP: No respiratory distress, no use of accessory muscles; CTA b/l, no WRR  CV: RRR, +S1S2, no MRG; no JVD; no peripheral edema  GI: Soft, NT, ND, no rebound, no guarding; no palpable masses; no hepatosplenomegaly  SKIN: No rashes or ulcers noted; no subcutaneous nodules or induration palpable  NEURO: CN II-XII intact; normal reflexes in upper and lower extremities, sensation intact in upper and lower extremities b/l to light touch   PSYCH: Appropriate insight/judgment; A+O x 3, mood and affect appropriate, recent/remote memory intact

## 2024-05-15 NOTE — DISCHARGE NOTE PROVIDER - HOSPITAL COURSE
58 yo F with Meniere's disease, HTN, Bipolar disorder, and COPD presents to the ED after a fall. Code stroke was called, CT with no acute stroke and no stenosis appreciated. MRI was completed that was negatived for acute stroke. TTE was completed with EF 61% and normal LV. Patient with no deficits and able to ambulate on floors without difficulty. Plan for discharge home. Patient on Lasix 20mg and triamterene/HCTZ given fall at home will hold medications on discharge and patient to followup with PCP on Monday.     #Fall  #Dizziness

## 2024-05-15 NOTE — DISCHARGE NOTE PROVIDER - NSDCMRMEDTOKEN_GEN_ALL_CORE_FT
Abilify 20 mg oral tablet: 1 tab(s) orally once a day (at bedtime)  Anoro Ellipta 62.5 mcg-25 mcg/inh inhalation powder: 1 puff(s) inhaled once a day  FLUoxetine 40 mg oral capsule: 1 cap(s) orally once a day  lamoTRIgine 150 mg oral tablet: 1 tab(s) orally 2 times a day  levothyroxine 25 mcg (0.025 mg) oral tablet: 1 tab(s) orally once a day  Ritalin 20 mg oral tablet: 1 tab(s) orally once a day   albuterol 90 mcg/inh inhalation aerosol: 2 puff(s) inhaled every 6 hours as needed for  shortness of breath and/or wheezing  Anoro Ellipta 62.5 mcg-25 mcg/inh inhalation powder: 1 puff(s) inhaled once a day  ARIPiprazole 20 mg oral tablet: 1 tab(s) orally once a day (at bedtime)  atorvastatin 10 mg oral tablet: 1 tab(s) orally once a day  calcium (as carbonate) 500 mg oral tablet: 1 tab(s) orally once a day  FLUoxetine 40 mg oral capsule: 1 cap(s) orally once a day in the morning  ipratropium-albuterol 0.5 mg-2.5 mg/3 mL inhalation solution: 3 milliliter(s) by nebulizer 4 times a day as needed for  shortness of breath and/or wheezing  lamoTRIgine 150 mg oral tablet: 1 tab(s) orally 2 times a day  levothyroxine 25 mcg (0.025 mg) oral tablet: 1 tab(s) orally once a day  lidocaine 4% topical film: Apply topically to affected area once a day as needed for  mild pain (leave on for 12 hours then remove and keep off for 12 hours before applying new patch)  methylphenidate 20 mg oral tablet: 1 tab(s) orally once a day in the morning (ISTOP Reference #: [id number]; 30 tablets for a 30-day supply dispensed 04/22/2024)  Multiple Vitamins with Minerals oral tablet: 1 tab(s) orally once a day  Nyamyc 100,000 units/g topical powder: Apply topically to affected area 2 times a day as needed for  rash  pantoprazole 40 mg oral delayed release tablet: 1 tab(s) orally once a day

## 2024-05-15 NOTE — DISCHARGE NOTE PROVIDER - CARE PROVIDER_API CALL
[place] [address]  Phone: [phone]  Fax: [phone]  Established Patient  Follow Up Time: 1 week

## 2024-05-15 NOTE — PHYSICAL THERAPY INITIAL EVALUATION ADULT - ADDITIONAL COMMENTS
Patient lives with her 2 sons in private home, + to assist if needed. Patient was independent in all ADL prior to admission and was not using an assistive device prior but has walker and cane if needed. There are 3 steps to enter the home.

## 2024-05-15 NOTE — DISCHARGE NOTE PROVIDER - NSDCCPTREATMENT_GEN_ALL_CORE_FT
PRINCIPAL PROCEDURE  Procedure: MRI  Findings and Treatment: No diffusion restriction to suggest acute infarct. No hydrocephalus.   Multiple foci of increased T2/FLAIR signal throughout the subcortical,   deep, and periventricular white matter, likely related to chronic small   vessel disease. The visualized extra axial spaces and basal cisterns are   within normal limits. No midline shift or mass effect present.  The craniocervical junction is within normal limits. The pituitary is   within normal limits. Postoperative changes related to a partial right   mastoidectomy. The major intracranial vessels demonstrate the expected   signal void related to vascular flow. Mild mucosal thickening in the   ethmoid air cells. Trace fluid in the remaining right mastoid air cells.   The left mastoid air cells are well aerated. The visualized orbits are   within normal limits.  IMPRESSION:  1.  No evidence of acute infarct or midline shift.  2.  Chronic small vessel disease.  --- End of Report ---        SECONDARY PROCEDURE  Procedure: Transthoracic echocardiography (TTE)  Findings and Treatment:    1. Technically difficult image quality.   2. Left ventricular cavity is normal in size. Left ventricular wall thickness is normal. Left ventricular systolic function is normal with an ejection fraction of 61 % by Salazar's method of disks. There are no regional wall motion abnormalities seen.   3. Normal right ventricular cavity size and probably normal systolic function.   4. Structurally normal mitral valve with normal leaflet excursion. There is trace mitral regurgitation.

## 2024-05-15 NOTE — DISCHARGE NOTE PROVIDER - NSDCCPCAREPLAN_GEN_ALL_CORE_FT
PRINCIPAL DISCHARGE DIAGNOSIS  Diagnosis: Fall  Assessment and Plan of Treatment: You came in after a fall. You fell on on your right side. You had a CT scan done that showed no stroke. You also has an MRI that showed no evidence of acute stroke or bleeding. You an echo of your heart that showed normal heart function. You were able to walk around the floors with no difficulty. Please do not take lasix 20mg or triamterene-hydrochlorothiazide at home until you see your PCP. These medication can casue you to urinate alot and can increase lightheadness and dizziness increasing risk for falls.   Please be sure to follow up with your PCP within 1 week after discharge.

## 2024-08-02 NOTE — PATIENT PROFILE ADULT - FALL HARM RISK - FALLEN IN PAST
Skilled Nurse visit in the [facility] to administer Inflectra 1100mg IV.  No recent elevated temperature, fever, chills, productive cough, coughing for 3 weeks or longer or hemoptysis, abnormal vital signs, night sweats, chest pain. No  decrease in your appetite, unexplained weight loss or fatigue.  No other new onset medical symptoms.  Current weight 324lb.  Peripheral IVleft Wrist, 1attempt Pre medicated with Acetaminophen 650mg po and Diphenhydramine 50mg po. Labs drawn Infliximab level. Infusion completed without complication or reaction. Pt reports therapy iseffective in managing symptoms related to therapy.   Sherrie Coleman RN  [facility]  [email or web address]  [phone]    Accidental fall

## 2025-03-06 NOTE — ED PROVIDER NOTE - CRTICAL CARE TIME SPENT (MIN)
I really think she needs a CT of her sinuses either today or tomorrow.  I also had some labs that were ordered at her visit that I would like to have these drawn.  If Levaquin is not working we need to get a look at her sinuses as this covers multiple organisms it is concerning that this is not working  She needs to hold sinus rinses 24 hours prior, so if she already did a rinse today she can do the CT scan tomorrow.  Please assist with getting this scheduled  She also needs to get the labs that I ordered at her visit last week.  Please let her know I added some additional labs checking her white blood cell count liver/kidney function, and inflammation which will help us determine the extent of her infection    In the meantime we can change the antibiotic - the 2 options would be either clindamycin or augmentin - she has had both of these with the recent infection.  Augmentin is the 1 that she took 2 times a day and clindamycin is the 1 that she took 3 times a day does she feel either of these work better than the other  You may need to have an ENT get involved and culture her sinuses.  But lets start with a CT of her sinus    I will make sure to look at the images tomorrow if she could text me after she has her CAT scan done.  My cell [phone]   36